# Patient Record
Sex: MALE | Race: WHITE | NOT HISPANIC OR LATINO | Employment: PART TIME | URBAN - METROPOLITAN AREA
[De-identification: names, ages, dates, MRNs, and addresses within clinical notes are randomized per-mention and may not be internally consistent; named-entity substitution may affect disease eponyms.]

---

## 2017-01-02 ENCOUNTER — CARE COORDINATION (OUTPATIENT)
Dept: CARE COORDINATION | Facility: CLINIC | Age: 32
End: 2017-01-02

## 2017-01-02 NOTE — Clinical Note
[facility] CARE COORDINATION  [facility]   [address] [place] [address]      January 5, 2017      Yuan MYRA Brandonsilvio  [address]  [facility] [address]    Dear Yuan,  I am the Clinic Care Coordinator that works with your primary care provider's clinic. I recently tried to call and was unable to reach you. Please call if future needs.  Below is a description of what Clinic Care Coordination is and how I can further assist you.     The Clinic Care Coordinator role is a Registered Nurse and/or  who understands the health care system. The goal of Clinic Care Coordination is to help you manage your health and improve access to the [facility] system in the most efficient manner.  The Registered Nurse can assist you in meeting your health care goals by providing education, coordinating services, and strengthening the communication among your providers. The  can assist you with financial, behavioral, psychosocial, and chemical dependency and counseling/psychiatric resources.    Please feel free to keep this letter and contact information to contact me at [phone] with any further questions or concerns that may arise. We at [facility] are focused on providing you with the highest-quality healthcare experience possible and that all starts with you.       Sincerely,     Mila Keating Care Coordinator RN  [facility]  [phone]

## 2017-01-02 NOTE — PROGRESS NOTES
Clinic Care Coordination Contact  [facility]/Voicemail    Referral Source: ED Follow-Up  Clinical Data: Care Coordinator Follow Up Outreach costochondritis   Outreach attempted x 1.  Left message on voicemail with call back information and requested return call.  Plan:  Care Coordinator will try to reach patient again in 1-2 business days.    Mila Keating Care Coordinator RN  [facility]  [phone]

## 2017-01-03 NOTE — PROGRESS NOTES
Clinic Care Coordination Contact  [facility]/Voicemail    Referral Source: ED Follow-Up  Clinical Data: Care Coordinator Outreach  Outreach attempted x 2.  Left message on voicemail with call back information and requested return call.  Plan:  Care Coordinator 2 business days unable to contact letter will be mailed     Milasa Keating Care Coordinator RN  [facility]  [phone]

## 2017-01-05 NOTE — PROGRESS NOTES
Clinic Care Coordination Contact  [facility]    Referral Source: ED Follow-Up  Clinical Data: Care Coordinator Outreach  Outreach attempted x 3.  Unable to contact letter mailed   Plan: Care Coordinator will do no further outreaches at this time.    Mila Keating Care Coordinator RN  [facility]  [phone]

## 2017-01-30 DIAGNOSIS — R42 DIZZINESS: Primary | ICD-10-CM

## 2017-01-30 DIAGNOSIS — F41.9 ANXIETY: ICD-10-CM

## 2017-01-30 NOTE — TELEPHONE ENCOUNTER
LORazepam (ATIVAN) 0.5 MG tablet  Last Written Prescription Date:  12/22/16  Last Fill Quantity: 30,   # refills: 0  Last Office Visit with [facility] prescribing provider: 8/4/2016    Future Office visit:    Next 5 appointments (look out 90 days)     Mar 28, 2017  9:30 AM   Return Visit with ARON Li CNP   [facility]    [address]  [facility] [address]   [phone]                   Routing refill request to provider for review/approval because:  Drug not on the [facility] refill protocol or controlled substance       checked 11/14/2016-No red flags.      JAGDISH Alberto

## 2017-02-06 NOTE — TELEPHONE ENCOUNTER
Why has it been 7 days?  I'm just seeing it now.  I'm going to let Agueda do it.  She is listed as his primary.  Most previous Rx's have come from her.      Thanks,  Deacon

## 2017-02-06 NOTE — TELEPHONE ENCOUNTER
[facility] pharmacy calling to check status of refill it was requested 7 days ago. Please review and advise.     Routed to Deacon Benson & Agueda Esteban

## 2017-02-07 RX ORDER — LORAZEPAM 0.5 MG/1
0.5 TABLET ORAL EVERY 6 HOURS PRN
Qty: 30 TABLET | Refills: 0 | OUTPATIENT
Start: 2017-02-07

## 2017-02-07 NOTE — TELEPHONE ENCOUNTER
I cannot fill this. It looks like it has been a long time since he was seen for anxiety. Does he have a mental health doctor at all?

## 2017-02-07 NOTE — TELEPHONE ENCOUNTER
Woman answered phone, said he wasn't available,  Asked that he give us a call back, said she wasn't able to write down phone #  Aditi Velasquez RN, BS  Clinical Nurse Triage .

## 2017-02-07 NOTE — TELEPHONE ENCOUNTER
I have not seen him for a long time, and he should be seen every 6 months for controlled medications. He has been seeing Deacon more lately, but he needs an appt either way, with one of us

## 2017-02-09 ENCOUNTER — OFFICE VISIT (OUTPATIENT)
Dept: FAMILY MEDICINE | Facility: CLINIC | Age: 32
End: 2017-02-09
Payer: MEDICARE

## 2017-02-09 VITALS
SYSTOLIC BLOOD PRESSURE: 130 MMHG | WEIGHT: 287 LBS | HEART RATE: 80 BPM | BODY MASS INDEX: 40.05 KG/M2 | TEMPERATURE: 97.8 F | DIASTOLIC BLOOD PRESSURE: 84 MMHG | RESPIRATION RATE: 16 BRPM

## 2017-02-09 DIAGNOSIS — F41.9 ANXIETY: ICD-10-CM

## 2017-02-09 DIAGNOSIS — Z23 NEED FOR PROPHYLACTIC VACCINATION AND INOCULATION AGAINST INFLUENZA: ICD-10-CM

## 2017-02-09 DIAGNOSIS — F32.1 MAJOR DEPRESSIVE DISORDER, SINGLE EPISODE, MODERATE (H): Primary | ICD-10-CM

## 2017-02-09 DIAGNOSIS — R42 DIZZINESS: ICD-10-CM

## 2017-02-09 DIAGNOSIS — J98.01 ACUTE BRONCHOSPASM: ICD-10-CM

## 2017-02-09 DIAGNOSIS — G47.00 INSOMNIA, UNSPECIFIED TYPE: ICD-10-CM

## 2017-02-09 PROCEDURE — 99214 OFFICE O/P EST MOD 30 MIN: CPT | Mod: 25 | Performed by: PHYSICIAN ASSISTANT

## 2017-02-09 PROCEDURE — 90686 IIV4 VACC NO PRSV 0.5 ML IM: CPT | Performed by: PHYSICIAN ASSISTANT

## 2017-02-09 PROCEDURE — G0008 ADMIN INFLUENZA VIRUS VAC: HCPCS | Performed by: PHYSICIAN ASSISTANT

## 2017-02-09 RX ORDER — CHOLECALCIFEROL (VITAMIN D3) 50 MCG
2000 TABLET ORAL DAILY
Qty: 90 TABLET | Refills: 1 | Status: SHIPPED | OUTPATIENT
Start: 2017-02-09 | End: 2017-07-20

## 2017-02-09 RX ORDER — ALBUTEROL SULFATE 90 UG/1
2 AEROSOL, METERED RESPIRATORY (INHALATION) EVERY 6 HOURS PRN
Qty: 1 INHALER | Refills: 1 | Status: SHIPPED | OUTPATIENT
Start: 2017-02-09 | End: 2023-10-08

## 2017-02-09 RX ORDER — DULOXETIN HYDROCHLORIDE 60 MG/1
60 CAPSULE, DELAYED RELEASE ORAL DAILY
Qty: 90 CAPSULE | Refills: 1 | Status: SHIPPED | OUTPATIENT
Start: 2017-02-09 | End: 2017-08-01

## 2017-02-09 RX ORDER — TRAZODONE HYDROCHLORIDE 50 MG/1
150 TABLET, FILM COATED ORAL AT BEDTIME
Qty: 90 TABLET | Refills: 1 | Status: SHIPPED | OUTPATIENT
Start: 2017-02-09 | End: 2019-04-03

## 2017-02-09 RX ORDER — DULOXETIN HYDROCHLORIDE 30 MG/1
60 CAPSULE, DELAYED RELEASE ORAL DAILY
Refills: 0 | COMMUNITY
Start: 2016-08-16 | End: 2017-02-09

## 2017-02-09 RX ORDER — LORAZEPAM 0.5 MG/1
0.5 TABLET ORAL EVERY 6 HOURS PRN
Qty: 30 TABLET | Refills: 0 | Status: SHIPPED | OUTPATIENT
Start: 2017-02-09 | End: 2019-04-03

## 2017-02-09 RX ORDER — BUSPIRONE HYDROCHLORIDE 5 MG/1
TABLET ORAL
Qty: 150 TABLET | Refills: 0 | Status: SHIPPED
Start: 2017-02-09 | End: 2019-04-03

## 2017-02-09 ASSESSMENT — ANXIETY QUESTIONNAIRES
3. WORRYING TOO MUCH ABOUT DIFFERENT THINGS: MORE THAN HALF THE DAYS
IF YOU CHECKED OFF ANY PROBLEMS ON THIS QUESTIONNAIRE, HOW DIFFICULT HAVE THESE PROBLEMS MADE IT FOR YOU TO DO YOUR WORK, TAKE CARE OF THINGS AT HOME, OR GET ALONG WITH OTHER PEOPLE: SOMEWHAT DIFFICULT
5. BEING SO RESTLESS THAT IT IS HARD TO SIT STILL: MORE THAN HALF THE DAYS
6. BECOMING EASILY ANNOYED OR IRRITABLE: MORE THAN HALF THE DAYS
GAD7 TOTAL SCORE: 14
7. FEELING AFRAID AS IF SOMETHING AWFUL MIGHT HAPPEN: MORE THAN HALF THE DAYS
1. FEELING NERVOUS, ANXIOUS, OR ON EDGE: MORE THAN HALF THE DAYS
2. NOT BEING ABLE TO STOP OR CONTROL WORRYING: MORE THAN HALF THE DAYS

## 2017-02-09 ASSESSMENT — PATIENT HEALTH QUESTIONNAIRE - PHQ9: 5. POOR APPETITE OR OVEREATING: MORE THAN HALF THE DAYS

## 2017-02-09 NOTE — NURSING NOTE
"Chief Complaint   Patient presents with     Anxiety     phq9       Initial /94 mmHg  Pulse 80  Temp(Src) 97.8  F (36.6  C) (Oral)  Resp 16  Wt 287 lb (130.182 kg) Estimated body mass index is 40.05 kg/(m^2) as calculated from the following:    Height as of 12/6/16: 5' 11\" (1.803 m).    Weight as of this encounter: 287 lb (130.182 kg).  Medication Reconciliation: complete   Emily Guerrero MA      "

## 2017-02-09 NOTE — PROGRESS NOTES
"  SUBJECTIVE:                                                    Yuan Hensley is a 31 year old male who presents to clinic today for the following health issues:      Medication Followup of Ativan    Taking Medication as prescribed: yes    Side Effects:  None    Medication Helping Symptoms:  yes       PROBLEMS TO ADD ON...  Hypertension Follow-up      Outpatient blood pressures are not being checked.    Low Salt Diet: no added salt       Depression and Anxiety Follow-Up    Status since last visit: Worsened     Other associated symptoms:None    Complicating factors:     Significant life event: Yes-  Many different events     Current substance abuse: None    PHQ-9 SCORE 2015   Total Score - - -   Total Score 20 21 21     FROYLAN-7 SCORE 2015   Total Score 21 - -   Total Score - 21 21        PHQ-9  English      PHQ-9   Any Language     GAD7     Was seeing a psychiatrist but not anymore.  Will need refill of cymbalta and vitamin D.  End of  was \"not good\".  In  his son , then grandfather and grandmother both ended up in nursing home.  Had been off work for 8 years.  Was caring for grandparents and depression/anxiety were a problem.  Got a job that he will be starting in next two weeks at [employer].  Nervous about this.    Ativan helps also with dizziness.  When he doesn't take it his head \"just doesn't feel there\".  Does get vertigo, maybe hospital put him on.  Has been a couple years perhaps that he's been on.  Takes ativan in PM, usually to try to help sleep.  Has not been sleeping that well.  Also has trazodone.      Problem list and histories reviewed & adjusted, as indicated.  Additional history: as documented    Problem list, Medication list, Allergies, and Medical/Social/Surgical histories [facility] in [facility] and updated as appropriate.    ROS:  Constitutional, HEENT, cardiovascular, pulmonary, gi and gu systems are negative, except as otherwise " "noted.    OBJECTIVE:                                                    /84  Pulse 80  Temp 97.8  F (36.6  C) (Oral)  Resp 16  Wt 287 lb (130.2 kg)  BMI 40.03 kg/m2  Body mass index is 40.03 kg/(m^2).  GENERAL: healthy, alert and no distress  MS: no gross musculoskeletal defects noted, no edema  SKIN: no suspicious lesions or rashes  PSYCH: mentation appears normal, affect flat and anxious    Diagnostic Test Results:  none      ASSESSMENT/PLAN:                                                    1. Major depressive disorder, single episode, moderate (H)  Refilled.  Continue on Cymbalta.  - Cholecalciferol (VITAMIN D) 2000 UNITS tablet; Take 2,000 Units by mouth daily  Dispense: 90 tablet; Refill: 1    2. Anxiety  Refilled, start Buspar to see if we can decrease the amount of Ativan he is taking.  - DULoxetine (CYMBALTA) 60 MG EC capsule; Take 1 capsule (60 mg) by mouth daily  Dispense: 90 capsule; Refill: 1  - busPIRone (BUSPAR) 5 MG tablet; Start at 5 mg twice daily for 3 days, then 7.5 mg (1.5 tabs) twice daily for 3 days, then 10 mg (2 tabs) twice daily for 3 days, then 12.5 mg (2.5 tabs) twice daily for 3 days, then 15 mg (3 tabs) twice daily and stay at that dose  Dispense: 150 tablet; Refill: 0  - LORazepam (ATIVAN) 0.5 MG tablet; Take 1 tablet (0.5 mg) by mouth every 6 hours as needed for anxiety  Dispense: 30 tablet; Refill: 0    3. Acute bronchospasm  Refilled.  - albuterol (ALBUTEROL) 108 (90 BASE) MCG/ACT Inhaler; Inhale 2 puffs into the lungs every 6 hours as needed for shortness of breath / dyspnea  Dispense: 1 Inhaler; Refill: 1    4. Need for prophylactic vaccination and inoculation against influenza    - FLU VAC, SPLIT VIRUS IM > 3 YO (QUADRIVALENT) [66026]  - Vaccine Administration, Initial [16321]    5. Dizziness  It seems he was put on this for \"dizziness\" and believes that is also what he is taking this for.  Discussed addictive nature of this medication and will try to wean off with use of " Buspar for anxiety.  - LORazepam (ATIVAN) 0.5 MG tablet; Take 1 tablet (0.5 mg) by mouth every 6 hours as needed for anxiety  Dispense: 30 tablet; Refill: 0    6. Insomnia, unspecified type    - traZODone (DESYREL) 50 MG tablet; Take 3 tablets (150 mg) by mouth At Bedtime  Dispense: 90 tablet; Refill: 1        Deacon Benson PA-C  [facility]

## 2017-02-09 NOTE — MR AVS SNAPSHOT
After Visit Summary   2/9/2017    Yuan Hensley    MRN: [id number]           Patient Information     Date Of Birth          1985        Visit Information        Provider Department      2/9/2017 10:20 AM Deacon Benson PA-C [facility]        Today's Diagnoses     Major depressive disorder, single episode, moderate (H)    -  1     Anxiety         Acute bronchospasm         Need for prophylactic vaccination and inoculation against influenza         Dizziness         Insomnia, unspecified type            Follow-ups after your visit        Follow-up notes from your care team     Return in about 2 months (around 4/9/2017).      Your next 10 appointments already scheduled     Mar 28, 2017  9:30 AM   Return Visit with ARON Li CNP   [facility]    [address]  [facility] [address] [phone]              Who to contact     If you have questions or need follow up information about today's clinic visit or your schedule please contact [facility] directly at [phone].  Normal or non-critical lab and imaging results will be communicated to you by [employer], letter or phone within 4 business days after the clinic has received the results. If you do not hear from us within 7 days, please contact the clinic through [employer] or phone. If you have a critical or abnormal lab result, we will notify you by phone as soon as possible.  Submit refill requests through [employer] or call your pharmacy and they will forward the refill request to us. Please allow 3 business days for your refill to be completed.          Additional Information About Your Visit        MyChart Information     [employer] gives you secure access to your electronic health record. If you see a primary care provider, you can also send messages to your care team and make appointments. If you have questions, please call your  primary care clinic.  If you do not have a primary care provider, please call [phone] and they will assist you.        Care EveryWhere ID     This is your Care EveryWhere ID. This could be used by other organizations to access your [facility] medical records  [id number]        Your Vitals Were     Pulse Temperature Respirations             80 97.8  F (36.6  C) (Oral) 16          Blood Pressure from Last 3 Encounters:   02/09/17 130/94   12/27/16 130/82   12/07/16 115/77    Weight from Last 3 Encounters:   02/09/17 287 lb (130.182 kg)   12/27/16 290 lb 9.6 oz (131.815 kg)   12/06/16 290 lb (131.543 kg)              We Performed the Following     FLU VAC, SPLIT VIRUS IM > 3 YO (QUADRIVALENT) [96374]     Vaccine Administration, Initial [28783]          Today's Medication Changes          These changes are accurate as of: 2/9/17 11:13 AM.  If you have any questions, ask your nurse or doctor.               Start taking these medicines.        Dose/Directions    busPIRone 5 MG tablet   Commonly known as:  BUSPAR   Used for:  Anxiety   Started by:  Deacon Benson PA-C        Start at 5 mg twice daily for 3 days, then 7.5 mg (1.5 tabs) twice daily for 3 days, then 10 mg (2 tabs) twice daily for 3 days, then 12.5 mg (2.5 tabs) twice daily for 3 days, then 15 mg (3 tabs) twice daily and stay at that dose   Quantity:  150 tablet   Refills:  0         These medicines have changed or have updated prescriptions.        Dose/Directions    DULoxetine 60 MG EC capsule   Commonly known as:  CYMBALTA   This may have changed:  medication strength   Used for:  Anxiety   Changed by:  Deacon Benson PA-C        Dose:  60 mg   Take 1 capsule (60 mg) by mouth daily   Quantity:  90 capsule   Refills:  1       traZODone 50 MG tablet   Commonly known as:  DESYREL   This may have changed:  how much to take   Used for:  Insomnia, unspecified type   Changed by:  Deacon Benson PA-C        Dose:  150 mg   Take 3  tablets (150 mg) by mouth At Bedtime   Quantity:  90 tablet   Refills:  1       vitamin D 2000 UNITS tablet   This may have changed:    - how much to take  - how to take this  - when to take this   Used for:  Major depressive disorder, single episode, moderate (H)   Changed by:  Deacon Benson PA-C        Dose:  2000 Units   Take 2,000 Units by mouth daily   Quantity:  90 tablet   Refills:  1            Where to get your medicines      These medications were sent to [id number] - [facility] [address] [place] [id number]     Phone:  [phone]    - albuterol 108 (90 BASE) MCG/ACT Inhaler  - busPIRone 5 MG tablet  - DULoxetine 60 MG EC capsule  - traZODone 50 MG tablet  - vitamin D 2000 UNITS tablet      Some of these will need a paper prescription and others can be bought over the counter.  Ask your nurse if you have questions.     Bring a paper prescription for each of these medications    - LORazepam 0.5 MG tablet             Primary Care Provider Office Phone # Fax #    Deacon Benson PA-C [phone]       [facility] [address]  [facility] [address]        Thank you!     Thank you for choosing [facility]  for your care. Our goal is always to provide you with excellent care. Hearing back from our patients is one way we can continue to improve our services. Please take a few minutes to complete the written survey that you may receive in the mail after your visit with us. Thank you!             Your Updated Medication List - Protect others around you: Learn how to safely use, store and throw away your medicines at www.disposemymeds.org.          This list is accurate as of: 2/9/17 11:13 AM.  Always use your most recent med list.                   Brand Name Dispense Instructions for use    albuterol 108 (90 BASE) MCG/ACT Inhaler    albuterol    1 Inhaler    Inhale 2 puffs into the lungs every 6 hours as  needed for shortness of breath / dyspnea       busPIRone 5 MG tablet    BUSPAR    150 tablet    Start at 5 mg twice daily for 3 days, then 7.5 mg (1.5 tabs) twice daily for 3 days, then 10 mg (2 tabs) twice daily for 3 days, then 12.5 mg (2.5 tabs) twice daily for 3 days, then 15 mg (3 tabs) twice daily and stay at that dose       DULoxetine 60 MG EC capsule    CYMBALTA    90 capsule    Take 1 capsule (60 mg) by mouth daily       fluticasone 50 MCG/ACT spray    FLONASE    16 g    Spray 1-2 sprays into both nostrils daily       gabapentin 100 MG capsule    NEURONTIN    60 capsule    Take 1 capsule by mouth about 30 minutes prior to typical onset of restlessness. May increase by 1 capsule as needed every 3 days to max of 3.       LORazepam 0.5 MG tablet    ATIVAN    30 tablet    Take 1 tablet (0.5 mg) by mouth every 6 hours as needed for anxiety       losartan 100 MG tablet    COZAAR    90 tablet    Take 1 tablet (100 mg) by mouth daily       metFORMIN 500 MG 24 hr tablet    GLUCOPHAGE-XR    120 tablet    Take 2 tablets (1,000 mg) by mouth 2 times daily (with meals)       nebivolol 5 MG tablet    BYSTOLIC    30 tablet    Take 1 tablet (5 mg) by mouth daily       omeprazole 40 MG capsule    priLOSEC    60 capsule    Take 1 capsule (40 mg) by mouth 2 times daily Take 30-60 minutes before a meal.       phentermine 37.5 MG tablet    ADIPEX-P    31 tablet    Take 1 tablet (37.5 mg) by mouth every morning (before breakfast)       traZODone 50 MG tablet    DESYREL    90 tablet    Take 3 tablets (150 mg) by mouth At Bedtime       vitamin D 2000 UNITS tablet     90 tablet    Take 2,000 Units by mouth daily

## 2017-02-09 NOTE — PROGRESS NOTES
Injectable Influenza Immunization Documentation    1.  Is the person to be vaccinated sick today?  No    2. Does the person to be vaccinated have an allergy to eggs or to a component of the vaccine?  No    3. Has the person to be vaccinated today ever had a serious reaction to influenza vaccine in the past?  No    4. Has the person to be vaccinated ever had Guillain-[place]ville syndrome?  No     Form completed by Emily Guerrero MA

## 2017-02-10 ASSESSMENT — ANXIETY QUESTIONNAIRES: GAD7 TOTAL SCORE: 14

## 2017-02-10 ASSESSMENT — PATIENT HEALTH QUESTIONNAIRE - PHQ9: SUM OF ALL RESPONSES TO PHQ QUESTIONS 1-9: 20

## 2017-03-13 DIAGNOSIS — I10 ESSENTIAL HYPERTENSION, BENIGN: ICD-10-CM

## 2017-03-13 RX ORDER — LOSARTAN POTASSIUM 100 MG/1
100 TABLET ORAL DAILY
Qty: 90 TABLET | Refills: 1 | Status: SHIPPED | OUTPATIENT
Start: 2017-03-13 | End: 2019-04-03

## 2017-03-13 NOTE — TELEPHONE ENCOUNTER
Pending Prescriptions:                       Disp   Refills    losartan (COZAAR) 100 MG tablet           90 tab*1            Sig: Take 1 tablet (100 mg) by mouth daily            Last Written Prescription Date: 9/8/2016    Last Fill Quantity: 90, # refills: 1  Last Office Visit with [facility], [facility] or [facility] prescribing provider: 2/9/2017  Next 5 appointments (look out 90 days)     Mar 28, 2017  9:30 AM CDT   Return Visit with ARON Li CNP   [facility]    [address]  [facility] [address]   [phone]                   Potassium   Date Value Ref Range Status   12/06/2016 3.9 3.4 - 5.3 mmol/L Final     Creatinine   Date Value Ref Range Status   12/06/2016 0.87 0.66 - 1.25 mg/dL Final     BP Readings from Last 3 Encounters:   02/09/17 130/84   12/27/16 130/82   12/07/16 115/77

## 2017-03-24 ENCOUNTER — OFFICE VISIT (OUTPATIENT)
Dept: FAMILY MEDICINE | Facility: CLINIC | Age: 32
End: 2017-03-24
Payer: MEDICARE

## 2017-03-24 VITALS
TEMPERATURE: 98.7 F | SYSTOLIC BLOOD PRESSURE: 134 MMHG | DIASTOLIC BLOOD PRESSURE: 108 MMHG | RESPIRATION RATE: 16 BRPM | HEART RATE: 92 BPM | BODY MASS INDEX: 39.05 KG/M2 | WEIGHT: 280 LBS | OXYGEN SATURATION: 96 %

## 2017-03-24 DIAGNOSIS — I10 ESSENTIAL HYPERTENSION: ICD-10-CM

## 2017-03-24 DIAGNOSIS — R68.89 FLU-LIKE SYMPTOMS: Primary | ICD-10-CM

## 2017-03-24 LAB
FLUAV+FLUBV AG SPEC QL: NEGATIVE
FLUAV+FLUBV AG SPEC QL: NORMAL
SPECIMEN SOURCE: NORMAL

## 2017-03-24 PROCEDURE — 87804 INFLUENZA ASSAY W/OPTIC: CPT | Performed by: PHYSICIAN ASSISTANT

## 2017-03-24 PROCEDURE — 99213 OFFICE O/P EST LOW 20 MIN: CPT | Performed by: PHYSICIAN ASSISTANT

## 2017-03-24 RX ORDER — CODEINE PHOSPHATE AND GUAIFENESIN 10; 100 MG/5ML; MG/5ML
SOLUTION ORAL
Qty: 120 ML | Refills: 0 | Status: SHIPPED | OUTPATIENT
Start: 2017-03-24 | End: 2019-04-03

## 2017-03-24 NOTE — PROGRESS NOTES
SUBJECTIVE:                                                    Yuan Hensley is a 31 year old male who presents to clinic today for the following health issues:      RESPIRATORY SYMPTOMS      Duration: couple days    Description  rhinorrhea, sore throat, cough, fever, chills, headache, fatigue/malaise and nausea    Severity: moderate    Accompanying signs and symptoms: vomiting    History (predisposing factors):  none    Precipitating or alleviating factors: exposure to illness at home    Therapies tried and outcome:  Tylenol cold     Few days of illness and parents both at home have been pretty ill with fever and cough etc.  Just started a new job a month ago.  Has been working outside.  Having body aches, chills.  Vomiting last night.  Called in to work today.      PROBLEMS TO ADD ON...  Hypertension Follow-up      Outpatient blood pressures are not being checked.    Low Salt Diet: no added salt     Has not taken his medication today and is taking OTC cough/cold medication currently.      Problem list and histories reviewed & adjusted, as indicated.  Additional history: as documented    BP Readings from Last 3 Encounters:   03/24/17 (!) 134/108   02/09/17 130/84   12/27/16 130/82    Wt Readings from Last 3 Encounters:   03/24/17 280 lb (127 kg)   02/09/17 287 lb (130.2 kg)   12/27/16 290 lb 9.6 oz (131.8 kg)            Labs reviewed in EPIC    Reviewed and updated as needed this visit by clinical staff  Tobacco  Allergies  Meds  Problems  Med Hx  Surg Hx  Fam Hx  Soc Hx        Reviewed and updated as needed this visit by Provider         ROS:  Constitutional, HEENT, cardiovascular, pulmonary, gi and gu systems are negative, except as otherwise noted.    OBJECTIVE:                                                    BP (!) 134/108 (BP Location: Left arm, Patient Position: Chair, Cuff Size: Adult Large)  Pulse 92  Temp 98.7  F (37.1  C) (Oral)  Resp 16  Wt 280 lb (127 kg)  SpO2 96%  BMI 39.05  kg/m2  Body mass index is 39.05 kg/(m^2).  GENERAL: healthy, alert and no distress  HENT: ear canals and TM's normal, nose and mouth without ulcers or lesions  NECK: no adenopathy, no asymmetry, masses, or scars and thyroid normal to palpation  RESP: lungs clear to auscultation - no rales, rhonchi or wheezes  MS: no gross musculoskeletal defects noted, no edema  SKIN: no suspicious lesions or rashes  PSYCH: mentation appears normal and affect flat    Diagnostic Test Results:  Results for orders placed or performed in visit on 03/24/17 (from the past 24 hour(s))   Influenza A/B antigen   Result Value Ref Range    Influenza A/B Agn Specimen Nasal     Influenza A Negative NEG    Influenza B  NEG     Negative   Test results must be correlated with clinical data. If necessary, results   should be confirmed by a molecular assay or viral culture.          ASSESSMENT/PLAN:                                                    1. Flu-like symptoms  Recommended supportive cares including warm salt water gargles, Tylenol/Ibuprofen as directed OTC, rest, humidifier.  Follow-up in 2-3 days if symptoms are worsening or not improving as expected/discussed.  He can use his inhaler that he has at home also for cough.    - Influenza A/B antigen  - guaiFENesin-codeine (ROBITUSSIN AC) 100-10 MG/5ML SOLN solution; 1-2 tsp PO qhs prn cough  Dispense: 120 mL; Refill: 0    2. Essential hypertension  - continue with medications as prescribed.  Follow up as directed.          Deacon Benson PA-C  [facility]

## 2017-03-24 NOTE — NURSING NOTE
"Chief Complaint   Patient presents with     URI     Patient Request for Note/Letter     for work       Initial BP (!) 134/108 (BP Location: Left arm, Patient Position: Chair, Cuff Size: Adult Large)  Pulse 92  Temp 98.7  F (37.1  C) (Oral)  Resp 16  Wt 280 lb (127 kg)  SpO2 96%  BMI 39.05 kg/m2 Estimated body mass index is 39.05 kg/(m^2) as calculated from the following:    Height as of 12/6/16: 5' 11\" (1.803 m).    Weight as of this encounter: 280 lb (127 kg).  Medication Reconciliation: complete   Emily Guerrero MA      "

## 2017-03-24 NOTE — MR AVS SNAPSHOT
After Visit Summary   3/24/2017    Yuan Hensley    MRN: [id number]           Patient Information     Date Of Birth          1985        Visit Information        Provider Department      3/24/2017 9:20 AM Deacon Benson PA-C [facility]        Today's Diagnoses     Flu-like symptoms    -  1      Care Instructions    Afrin nasal spray for 3 days.        Follow-ups after your visit        Follow-up notes from your care team     Return if symptoms worsen or fail to improve.      Your next 10 appointments already scheduled     Mar 28, 2017  9:30 AM CDT   [facility] Visit with ARON Li CNP   [facility]    [address] [facility] [address] [phone]              Who to contact     If you have questions or need follow up information about today's clinic visit or your schedule please contact [facility] directly at [phone].  Normal or non-critical lab and imaging results will be communicated to you by [employer], letter or phone within 4 business days after the clinic has received the results. If you do not hear from us within 7 days, please contact the clinic through [employer] or phone. If you have a critical or abnormal lab result, we will notify you by phone as soon as possible.  Submit refill requests through [employer] or call your pharmacy and they will forward the refill request to us. Please allow 3 business days for your refill to be completed.          Additional Information About Your Visit        MyChart Information     [employer] gives you secure access to your electronic health record. If you see a primary care provider, you can also send messages to your care team and make appointments. If you have questions, please call your primary care clinic.  If you do not have a primary care provider, please call [phone] and they will assist you.        Care EveryWhere ID     This  is your Care EveryWhere ID. This could be used by other organizations to access your [facility] medical records  [id number]        Your Vitals Were     Pulse Temperature Respirations Pulse Oximetry BMI (Body Mass Index)       92 98.7  F (37.1  C) (Oral) 16 96% 39.05 kg/m2        Blood Pressure from Last 3 Encounters:   03/24/17 (!) 134/108   02/09/17 130/84   12/27/16 130/82    Weight from Last 3 Encounters:   03/24/17 280 lb (127 kg)   02/09/17 287 lb (130.2 kg)   12/27/16 290 lb 9.6 oz (131.8 kg)              We Performed the Following     Influenza A/B antigen          Today's Medication Changes          These changes are accurate as of: 3/24/17 10:24 AM.  If you have any questions, ask your nurse or doctor.               Start taking these medicines.        Dose/Directions    guaiFENesin-codeine 100-10 MG/5ML Soln solution   Commonly known as:  ROBITUSSIN AC   Used for:  Flu-like symptoms   Started by:  Deacon Benson PA-C        1-2 tsp PO qhs prn cough   Quantity:  120 mL   Refills:  0            Where to get your medicines      Some of these will need a paper prescription and others can be bought over the counter.  Ask your nurse if you have questions.     Bring a paper prescription for each of these medications     guaiFENesin-codeine 100-10 MG/5ML Soln solution                Primary Care Provider Office Phone # Fax #    Deacon Benson PA-C [phone]       [facility] [address]  [address] [facility] [address]        Thank you!     Thank you for choosing [facility]  for your care. Our goal is always to provide you with excellent care. Hearing back from our patients is one way we can continue to improve our services. Please take a few minutes to complete the written survey that you may receive in the mail after your visit with us. Thank you!             Your Updated Medication List - Protect others around you: Learn how to safely use, store and  throw away your medicines at www.disposemymeds.org.          This list is accurate as of: 3/24/17 10:24 AM.  Always use your most recent med list.                   Brand Name Dispense Instructions for use    albuterol 108 (90 BASE) MCG/ACT Inhaler    albuterol    1 Inhaler    Inhale 2 puffs into the lungs every 6 hours as needed for shortness of breath / dyspnea       busPIRone 5 MG tablet    BUSPAR    150 tablet    Start at 5 mg twice daily for 3 days, then 7.5 mg (1.5 tabs) twice daily for 3 days, then 10 mg (2 tabs) twice daily for 3 days, then 12.5 mg (2.5 tabs) twice daily for 3 days, then 15 mg (3 tabs) twice daily and stay at that dose       DULoxetine 60 MG EC capsule    CYMBALTA    90 capsule    Take 1 capsule (60 mg) by mouth daily       fluticasone 50 MCG/ACT spray    FLONASE    16 g    Spray 1-2 sprays into both nostrils daily       gabapentin 100 MG capsule    NEURONTIN    60 capsule    Take 1 capsule by mouth about 30 minutes prior to typical onset of restlessness. May increase by 1 capsule as needed every 3 days to max of 3.       guaiFENesin-codeine 100-10 MG/5ML Soln solution    ROBITUSSIN AC    120 mL    1-2 tsp PO qhs prn cough       LORazepam 0.5 MG tablet    ATIVAN    30 tablet    Take 1 tablet (0.5 mg) by mouth every 6 hours as needed for anxiety       losartan 100 MG tablet    COZAAR    90 tablet    Take 1 tablet (100 mg) by mouth daily       metFORMIN 500 MG 24 hr tablet    GLUCOPHAGE-XR    120 tablet    Take 2 tablets (1,000 mg) by mouth 2 times daily (with meals)       nebivolol 5 MG tablet    BYSTOLIC    30 tablet    Take 1 tablet (5 mg) by mouth daily       omeprazole 40 MG capsule    priLOSEC    60 capsule    Take 1 capsule (40 mg) by mouth 2 times daily Take 30-60 minutes before a meal.       phentermine 37.5 MG tablet    ADIPEX-P    31 tablet    Take 1 tablet (37.5 mg) by mouth every morning (before breakfast)       traZODone 50 MG tablet    DESYREL    90 tablet    Take 3 tablets (150  mg) by mouth At Bedtime       vitamin D 2000 UNITS tablet     90 tablet    Take 2,000 Units by mouth daily

## 2017-03-24 NOTE — LETTER
[facility]  [address] [place] [address], [address]  [facility] [address] [phone]          March 24, 2017    RE:  Yuan Hensley                                                                                                                                                       [address]  [facility] [address]            To whom it may concern:    Yuan Hensley is under my professional care for Flu-like symptoms.  He may return to work on March 27, 2017.      Sincerely,          Deacon Benson PA-C

## 2017-04-10 ENCOUNTER — TELEPHONE (OUTPATIENT)
Dept: FAMILY MEDICINE | Facility: CLINIC | Age: 32
End: 2017-04-10

## 2017-04-10 NOTE — LETTER
[address], [address]  [facility] [address]  [phone]  April 25, 2017    Yuan Hensley  [address]  [facility] [address]      Dear Yuan,    I care about your health and have reviewed your health plan.  I have reviewed your medical conditions, medication list, and lab results and am making recommendations  based on this review, to better manage your health.    You are particularly in need of attention regarding:  -High Blood Pressure    I am recommending that you:  -schedule a NURSE-ONLY BLOOD PRESSURE APPOINTMENT within the next 1-4 weeks.      Here is a list of Health Maintenance topics that are due now or due soon:  There are no preventive care reminders to display for this patient.    Please call us at [phone] (or use [employer]) to address the above   recommendations.    Thank you for trusting [facility] and we appreciate the opportunity to serve you.  We look forward to supporting your healthcare needs in the future.    Healthy Regards,    Deacon PETERSEN

## 2017-04-10 NOTE — TELEPHONE ENCOUNTER
Panel Management Review      Patient has the following on his problem list:     Hypertension   Last three blood pressure readings:  BP Readings from Last 3 Encounters:   03/24/17 (!) 134/108   02/09/17 130/84   12/27/16 130/82     Blood pressure: FAILED    HTN Guidelines:  Age 18-59 BP range:  Less than 140/90  Age 60-85 with Diabetes:  Less than 140/90  Age 60-85 without Diabetes:  less than 150/90      Composite cancer screening  Chart review shows that this patient is due/due soon for the following None  Summary:    Patient is due/failing the following:   BP CHECK    Action needed:   Patient needs nurse only BLOOD PRESSURE check appointment.    Type of outreach:    Sent [employer] message.    Questions for provider review:    None                                                                                                                                    Lisa Magill, CMA       Chart routed to Care Team .

## 2017-04-17 ENCOUNTER — OFFICE VISIT (OUTPATIENT)
Dept: FAMILY MEDICINE | Facility: CLINIC | Age: 32
End: 2017-04-17
Payer: MEDICARE

## 2017-04-17 ENCOUNTER — RADIANT APPOINTMENT (OUTPATIENT)
Dept: GENERAL RADIOLOGY | Facility: CLINIC | Age: 32
End: 2017-04-17
Attending: FAMILY MEDICINE
Payer: MEDICARE

## 2017-04-17 VITALS
HEART RATE: 76 BPM | DIASTOLIC BLOOD PRESSURE: 90 MMHG | TEMPERATURE: 98.3 F | OXYGEN SATURATION: 97 % | SYSTOLIC BLOOD PRESSURE: 130 MMHG

## 2017-04-17 DIAGNOSIS — M25.531 RIGHT WRIST PAIN: ICD-10-CM

## 2017-04-17 DIAGNOSIS — M25.531 RIGHT WRIST PAIN: Primary | ICD-10-CM

## 2017-04-17 DIAGNOSIS — M25.561 RIGHT KNEE PAIN, UNSPECIFIED CHRONICITY: ICD-10-CM

## 2017-04-17 PROCEDURE — 99213 OFFICE O/P EST LOW 20 MIN: CPT | Performed by: FAMILY MEDICINE

## 2017-04-17 PROCEDURE — 73562 X-RAY EXAM OF KNEE 3: CPT | Mod: RT

## 2017-04-17 PROCEDURE — 73130 X-RAY EXAM OF HAND: CPT | Mod: RT

## 2017-04-17 NOTE — NURSING NOTE
"Chief Complaint   Patient presents with     Musculoskeletal Problem     Pain on L wrist and knuckles- swollen x3 days, L knee pain-when walking seems to locked up.       Initial /90 (BP Location: Right arm, Patient Position: Chair, Cuff Size: Adult Large)  Pulse 76  Temp 98.3  F (36.8  C) (Oral)  SpO2 97% Estimated body mass index is 39.05 kg/(m^2) as calculated from the following:    Height as of 12/6/16: 5' 11\" (1.803 m).    Weight as of 3/24/17: 280 lb (127 kg).  Medication Reconciliation: complete     Gabriela Jenkins CMA (AAMA)        "

## 2017-04-17 NOTE — MR AVS SNAPSHOT
After Visit Summary   4/17/2017    Yuan Hensley    MRN: [id number]           Patient Information     Date Of Birth          1985        Visit Information        Provider Department      4/17/2017 3:45 PM Varghese Carrillo MD [facility]        Today's Diagnoses     Right wrist pain    -  1    Right knee pain, unspecified chronicity          Care Instructions      Knee Pain  Knee pain is very common. It s especially common in active people who put a lot of pressure on their knees, like runners. It affects women more often than men.  Your kneecap (patella) is a thick, round bone. It covers and protects the front portion of your knee joint. It moves along a groove in your thighbone (femur) as part of the patellofemoral joint. A layer of cartilage surrounds the underside of your kneecap. This layer protects it from grinding against your femur.  When this cartilage softens and breaks down, it can cause knee pain. This is partly because of repetitive stress. The stress irritates the lining of the joint. This causes pain in the underlying bone.  What causes knee pain?  Many things can cause knee pain. You may have more than one cause. Some of these include:    Overuse of the knee joint    The kneecap doesn t line up with the tissue around it    Damage to small nerves in the area    Damage to the ligament-like structure that holds the kneecap in place (retinaculum)    Breakdown of the bone under the cartilage    Swelling in the soft tissues around the kneecap    Injury  You might be more likely to have knee pain if you:    Exercise a lot    Recently increased the intensity of your workouts    Have a body mass index (BMI) greater than 25    Have poor alignment of your kneecap    Walk with your feet turned overly outward or inward    Have weakness in surrounding muscle groups (inner quad or hip adductor muscles)    Have too much tightness in surrounding muscle groups (hamstrings or  iliotibial band)    Have a recent history of injury to the area    Are female  Symptoms of knee pain  This type of knee pain is a dull, aching pain in the front of the knee in the area under and around the kneecap. This pain may start quickly or slowly. Your pain might be worse when you squat, run, or sit for a long time. You might also sometimes feel like your knee is giving out. You may have symptoms in one or both of your knees.  Diagnosing knee pain  Your health care provider will ask about your medical history and your symptoms. Be sure to describe any activities that make your knee pain worse. He or she will look at your knee. This will include tests of your range of motion, strength, and areas of pain of your knee. Your knee alignment will be checked.  Your health care provider will need to rule out other causes of your knee pain, such as arthritis. You may need an imaging test, such as an X-ray or MRI.  Treatment for knee pain  Treatments that can help ease your symptoms may include:    Avoiding activities for a while that make your pain worse, returning to activity over time    Icing the outside of your knee when it causes you pain    Taking over-the-counter pain medicine    Wearing a knee brace or taping your knee to support it    Wearing special shoe inserts to help keep your feet in the proper alignment    Doing special exercises to stretch and strengthen the muscles around your hip and your knee  These steps help most people manage knee pain. But some cases of knee pain need to be treated with surgery. You may need surgery right away. Or you may need it later if other treatments don t work. Your health care provider may refer you to an orthopedic surgeon. He or she will talk with you about your choices.  Preventing knee pain  Losing weight and correcting excess muscle tightness or muscle weakness may help lower your risk.  In some cases, you can prevent knee pain. To help prevent a flare-up of knee  pain, you do these things:    Regularly do all the exercises your doctor or physical therapist advises    Support your knee as advised by your doctor or physical therapist    Increase training gradually, and ease up on training when needed    Have an expert check your gait for running or other sporting activities    Stretch properly before and after exercise    Replace your running shoes regularly    Lose excess weight     When to call your health care provider  Call your health care provider right away if:    Your symptoms don t get better after a few weeks of treatment    You have any new symptoms        1585-1550 The [employer]. [address] [place] [address]. All rights reserved. This information is not intended as a substitute for professional medical care. Always follow your healthcare professional's instructions.        Tendonitis  A tendon is the thick fibrous cord that joins muscle to bone and allows joints to move. When a tendon becomes inflamed, it is called tendonitis. This can occur from overuse, injury, or infection. This usually involves the shoulders, forearm, wrist, hands and foot. Symptoms include pain, swelling and tenderness to the touch. Moving the joint increases the pain.  It takes 4 to 6 weeks for tendonitis to heal. It is treated by preventing motion of the tendon with a splint or brace and the use of anti-inflammatory medicine.  Home care    Some people find relief with ice packs. These can be crushed or cubed ice in a plastic bag or a bag of frozen vegetables wrapped in a thin towel. Other people get better relief with heat. This can include a hot shower, hot bath, or a moist towel warmed in a microwave. Try each and use the method that feels best, for 15 to 20 minutes several times a day.    Rest the inflamed joint and protect it from movement.    You may use over-the-counter ibuprofen or naproxen to treat pain and inflammation, unless another medicine was  prescribed. If you can't take these medicines, acetaminophen may help with the pain, but does not treat inflammation. If you have chronic liver or kidney disease or ever had a stomach ulcer or gastrointestinal bleeding, talk with your doctor before using these medicines.    As your symptoms improve, begin gradual motion at the involved joint.  Follow-up care  Follow up with your healthcare provider if you are not improving after 5 days of treatment.  When to seek medical advice  Call your healthcare provider right away if any of these occur:    Redness over the painful area    Increasing pain or swelling at the joint    Fever (1 degree above your normal temperature) lasting 24 to 48 hours Or, whatever your healthcare provider told you to report based on your condition    8190-9860 [employer]. [address] [place] [address], [place] [address]. All rights reserved. This information is not intended as a substitute for professional medical care. Always follow your healthcare professional's instructions.              Follow-ups after your visit        Additional Services     ORTHO  REFERRAL       [facility] is referring you to the Orthopedic  Services at [facility].       The  Representative will assist you in the coordination of your Orthopedic and Musculoskeletal Care as prescribed by your physician.    The  Representative will call you within 1 business day to help schedule your appointment, or you may contact the  Representative at:    All areas ~ [phone]     Type of Referral : Non Surgical       Timeframe requested: Within 2 weeks    Coverage of these services is subject to the terms and limitations of your health insurance plan.  Please call member services at your health plan with any benefit or coverage questions.      If X-rays, CT or MRI's have been performed, please contact the facility where they were done to  arrange for , prior to your scheduled appointment.  Please bring this referral request to your appointment and present it to your specialist.                  Who to contact     If you have questions or need follow up information about today's clinic visit or your schedule please contact [facility] directly at [phone].  Normal or non-critical lab and imaging results will be communicated to you by MyChart, letter or phone within 4 business days after the clinic has received the results. If you do not hear from us within 7 days, please contact the clinic through [employer] or phone. If you have a critical or abnormal lab result, we will notify you by phone as soon as possible.  Submit refill requests through [employer] or call your pharmacy and they will forward the refill request to us. Please allow 3 business days for your refill to be completed.          Additional Information About Your Visit        MyChart Information     [employer] gives you secure access to your electronic health record. If you see a primary care provider, you can also send messages to your care team and make appointments. If you have questions, please call your primary care clinic.  If you do not have a primary care provider, please call [phone] and they will assist you.        Care EveryWhere ID     This is your Care EveryWhere ID. This could be used by other organizations to access your [facility] medical records  [id number]        Your Vitals Were     Pulse Temperature Pulse Oximetry             76 98.3  F (36.8  C) (Oral) 97%          Blood Pressure from Last 3 Encounters:   04/17/17 130/90   03/24/17 (!) 134/108   02/09/17 130/84    Weight from Last 3 Encounters:   03/24/17 280 lb (127 kg)   02/09/17 287 lb (130.2 kg)   12/27/16 290 lb 9.6 oz (131.8 kg)              We Performed the Following     ORTHO  REFERRAL          Today's Medication Changes          These changes are accurate as of: 4/17/17  5:14  PM.  If you have any questions, ask your nurse or doctor.               Start taking these medicines.        Dose/Directions    order for DME   Used for:  Right wrist pain   Started by:  Varghese Carrillo MD        Equipment being ordered: right wrist splint   Quantity:  1 each   Refills:  0            Where to get your medicines      Some of these will need a paper prescription and others can be bought over the counter.  Ask your nurse if you have questions.     Bring a paper prescription for each of these medications     order for DME                Primary Care Provider Office Phone # Fax #    Deacon Benson PA-C [phone]       [facility] [address]  [facility] [address]        Thank you!     Thank you for choosing [facility]  for your care. Our goal is always to provide you with excellent care. Hearing back from our patients is one way we can continue to improve our services. Please take a few minutes to complete the written survey that you may receive in the mail after your visit with us. Thank you!             Your Updated Medication List - Protect others around you: Learn how to safely use, store and throw away your medicines at www.disposemymeds.org.          This list is accurate as of: 4/17/17  5:14 PM.  Always use your most recent med list.                   Brand Name Dispense Instructions for use    albuterol 108 (90 BASE) MCG/ACT Inhaler    albuterol    1 Inhaler    Inhale 2 puffs into the lungs every 6 hours as needed for shortness of breath / dyspnea       busPIRone 5 MG tablet    BUSPAR    150 tablet    Start at 5 mg twice daily for 3 days, then 7.5 mg (1.5 tabs) twice daily for 3 days, then 10 mg (2 tabs) twice daily for 3 days, then 12.5 mg (2.5 tabs) twice daily for 3 days, then 15 mg (3 tabs) twice daily and stay at that dose       DULoxetine 60 MG EC capsule    CYMBALTA    90 capsule    Take 1 capsule (60 mg) by mouth daily        fluticasone 50 MCG/ACT spray    FLONASE    16 g    Spray 1-2 sprays into both nostrils daily       gabapentin 100 MG capsule    NEURONTIN    60 capsule    Take 1 capsule by mouth about 30 minutes prior to typical onset of restlessness. May increase by 1 capsule as needed every 3 days to max of 3.       guaiFENesin-codeine 100-10 MG/5ML Soln solution    ROBITUSSIN AC    120 mL    1-2 tsp PO qhs prn cough       LORazepam 0.5 MG tablet    ATIVAN    30 tablet    Take 1 tablet (0.5 mg) by mouth every 6 hours as needed for anxiety       losartan 100 MG tablet    COZAAR    90 tablet    Take 1 tablet (100 mg) by mouth daily       metFORMIN 500 MG 24 hr tablet    GLUCOPHAGE-XR    120 tablet    Take 2 tablets (1,000 mg) by mouth 2 times daily (with meals)       nebivolol 5 MG tablet    BYSTOLIC    30 tablet    Take 1 tablet (5 mg) by mouth daily       omeprazole 40 MG capsule    priLOSEC    60 capsule    Take 1 capsule (40 mg) by mouth 2 times daily Take 30-60 minutes before a meal.       order for DME     1 each    Equipment being ordered: right wrist splint       phentermine 37.5 MG tablet    ADIPEX-P    31 tablet    Take 1 tablet (37.5 mg) by mouth every morning (before breakfast)       traZODone 50 MG tablet    DESYREL    90 tablet    Take 3 tablets (150 mg) by mouth At Bedtime       vitamin D 2000 UNITS tablet     90 tablet    Take 2,000 Units by mouth daily

## 2017-04-17 NOTE — PATIENT INSTRUCTIONS
Knee Pain  Knee pain is very common. It s especially common in active people who put a lot of pressure on their knees, like runners. It affects women more often than men.  Your kneecap (patella) is a thick, round bone. It covers and protects the front portion of your knee joint. It moves along a groove in your thighbone (femur) as part of the patellofemoral joint. A layer of cartilage surrounds the underside of your kneecap. This layer protects it from grinding against your femur.  When this cartilage softens and breaks down, it can cause knee pain. This is partly because of repetitive stress. The stress irritates the lining of the joint. This causes pain in the underlying bone.  What causes knee pain?  Many things can cause knee pain. You may have more than one cause. Some of these include:    Overuse of the knee joint    The kneecap doesn t line up with the tissue around it    Damage to small nerves in the area    Damage to the ligament-like structure that holds the kneecap in place (retinaculum)    Breakdown of the bone under the cartilage    Swelling in the soft tissues around the kneecap    Injury  You might be more likely to have knee pain if you:    Exercise a lot    Recently increased the intensity of your workouts    Have a body mass index (BMI) greater than 25    Have poor alignment of your kneecap    Walk with your feet turned overly outward or inward    Have weakness in surrounding muscle groups (inner quad or hip adductor muscles)    Have too much tightness in surrounding muscle groups (hamstrings or iliotibial band)    Have a recent history of injury to the area    Are female  Symptoms of knee pain  This type of knee pain is a dull, aching pain in the front of the knee in the area under and around the kneecap. This pain may start quickly or slowly. Your pain might be worse when you squat, run, or sit for a long time. You might also sometimes feel like your knee is giving out. You may have symptoms in  one or both of your knees.  Diagnosing knee pain  Your health care provider will ask about your medical history and your symptoms. Be sure to describe any activities that make your knee pain worse. He or she will look at your knee. This will include tests of your range of motion, strength, and areas of pain of your knee. Your knee alignment will be checked.  Your health care provider will need to rule out other causes of your knee pain, such as arthritis. You may need an imaging test, such as an X-ray or MRI.  Treatment for knee pain  Treatments that can help ease your symptoms may include:    Avoiding activities for a while that make your pain worse, returning to activity over time    Icing the outside of your knee when it causes you pain    Taking over-the-counter pain medicine    Wearing a knee brace or taping your knee to support it    Wearing special shoe inserts to help keep your feet in the proper alignment    Doing special exercises to stretch and strengthen the muscles around your hip and your knee  These steps help most people manage knee pain. But some cases of knee pain need to be treated with surgery. You may need surgery right away. Or you may need it later if other treatments don t work. Your health care provider may refer you to an orthopedic surgeon. He or she will talk with you about your choices.  Preventing knee pain  Losing weight and correcting excess muscle tightness or muscle weakness may help lower your risk.  In some cases, you can prevent knee pain. To help prevent a flare-up of knee pain, you do these things:    Regularly do all the exercises your doctor or physical therapist advises    Support your knee as advised by your doctor or physical therapist    Increase training gradually, and ease up on training when needed    Have an expert check your gait for running or other sporting activities    Stretch properly before and after exercise    Replace your running shoes regularly    Lose  excess weight     When to call your health care provider  Call your health care provider right away if:    Your symptoms don t get better after a few weeks of treatment    You have any new symptoms        1821-6479 [employer]. [address] [place] [address], [place] [address]. All rights reserved. This information is not intended as a substitute for professional medical care. Always follow your healthcare professional's instructions.        Tendonitis  A tendon is the thick fibrous cord that joins muscle to bone and allows joints to move. When a tendon becomes inflamed, it is called tendonitis. This can occur from overuse, injury, or infection. This usually involves the shoulders, forearm, wrist, hands and foot. Symptoms include pain, swelling and tenderness to the touch. Moving the joint increases the pain.  It takes 4 to 6 weeks for tendonitis to heal. It is treated by preventing motion of the tendon with a splint or brace and the use of anti-inflammatory medicine.  Home care    Some people find relief with ice packs. These can be crushed or cubed ice in a plastic bag or a bag of frozen vegetables wrapped in a thin towel. Other people get better relief with heat. This can include a hot shower, hot bath, or a moist towel warmed in a microwave. Try each and use the method that feels best, for 15 to 20 minutes several times a day.    Rest the inflamed joint and protect it from movement.    You may use over-the-counter ibuprofen or naproxen to treat pain and inflammation, unless another medicine was prescribed. If you can't take these medicines, acetaminophen may help with the pain, but does not treat inflammation. If you have chronic liver or kidney disease or ever had a stomach ulcer or gastrointestinal bleeding, talk with your doctor before using these medicines.    As your symptoms improve, begin gradual motion at the involved joint.  Follow-up care  Follow up with your healthcare provider if you are not  improving after 5 days of treatment.  When to seek medical advice  Call your healthcare provider right away if any of these occur:    Redness over the painful area    Increasing pain or swelling at the joint    Fever (1 degree above your normal temperature) lasting 24 to 48 hours Or, whatever your healthcare provider told you to report based on your condition    4613-6364 [employer]. [address] [place] [address], [place] [address]. All rights reserved. This information is not intended as a substitute for professional medical care. Always follow your healthcare professional's instructions.

## 2017-04-18 NOTE — TELEPHONE ENCOUNTER
Left message with woman who answered the phone to have pt call back.    Nitesh Esteban   04/18/17

## 2017-04-19 NOTE — PROGRESS NOTES
"SUBJECTIVE:  Yuan Hensley, a 31 year old male scheduled an appointment to discuss the following issues:     Right wrist pain  Right knee pain, unspecified chronicity    Medical, social, surgical, and family histories reviewed.    Musculoskeletal Problem  Pain onR wrist and knuckles- swollen x3 days, R knee pain-when walking seems to locked up.      Pt tells me he has had knee problems for 16 years; mainly pain in the medial compartment of right knee, flexing is fine but extension sometimes feels like it's locking.  He played golf 3 days ago, felt his right 3rd knuckle \"cracked\" and hurt at the right wrist, thought he might have twisted it.  No open wound or paresthesia.    ROS:  See HPI.  No nausea/vomiting.  No fever/chills.  No chest pain/SOB.  No BM/urine problems.  No dizziness or syncope.      OBJECTIVE:  /90 (BP Location: Right arm, Patient Position: Chair, Cuff Size: Adult Large)  Pulse 76  Temp 98.3  F (36.8  C) (Oral)  SpO2 97%  EXAM:  GENERAL APPEARANCE: alert and mild distress  EYES: Eyes grossly normal to inspection, PERRL and conjunctivae and sclerae normal  HENT: ear canals and TM's normal and nose and mouth without ulcers or lesions  NECK: no adenopathy, no asymmetry, masses, or scars and thyroid normal to palpation  RESP: lungs clear to auscultation - no rales, rhonchi or wheezes  CV: regular rates and rhythm, normal S1 S2, no S3 or S4 and no murmur, click or rub  LYMPHATICS: normal ant/post cervical and supraclavicular nodes  ABDOMEN: soft, nontender, without hepatosplenomegaly or masses and bowel sounds normal  MS: extremities normal- no gross deformities noted; mild swelling dorsum right wrist without redness or increased temp; ROM wrist WNL, no snuff box tenderness; right knee---minimal effusion; no redness or increased temp; no ligament laxity, full flexion and extension able, some tenderness medial joint line.  ROM bilateral knees WNL.  Normal gait.  Neurovascularly intact " bilateral upper and lower extremities  SKIN: no suspicious lesions or rashes  NEURO: Normal strength and tone, mentation intact and speech normal    ASSESSMENT/PLAN:  (M25.531) Right wrist pain  (primary encounter diagnosis)  Comment:  Xray right hand negative;  Xray right knee negative for fracture or dislocation.  Plan: XR Knee Right 3 Views, XR Hand Right G/E 3         Views, order for DME, CANCELED: XR Wrist Right         G/E 3 Views        (M25.561) Right knee pain, unspecified chronicity  Plan: ORTHO  REFERRAL   Tylenol/Ibuprofen PRN.  Pt to f/up PCP if no improvement or worsening.  Warning signs and symptoms explained.

## 2017-04-25 ENCOUNTER — OFFICE VISIT (OUTPATIENT)
Dept: ORTHOPEDICS | Facility: CLINIC | Age: 32
End: 2017-04-25
Payer: MEDICARE

## 2017-04-25 VITALS
DIASTOLIC BLOOD PRESSURE: 98 MMHG | BODY MASS INDEX: 40.23 KG/M2 | HEIGHT: 70 IN | SYSTOLIC BLOOD PRESSURE: 126 MMHG | WEIGHT: 281 LBS

## 2017-04-25 DIAGNOSIS — M25.561 ACUTE PAIN OF RIGHT KNEE: Primary | ICD-10-CM

## 2017-04-25 DIAGNOSIS — E66.01 MORBID OBESITY, UNSPECIFIED OBESITY TYPE (H): ICD-10-CM

## 2017-04-25 PROCEDURE — 99203 OFFICE O/P NEW LOW 30 MIN: CPT | Performed by: PHYSICAL MEDICINE & REHABILITATION

## 2017-04-25 NOTE — MR AVS SNAPSHOT
After Visit Summary   4/25/2017    Yuan Hensley    MRN: [id number]           Patient Information     Date Of Birth          1985        Visit Information        Provider Department      4/25/2017 10:00 AM Kwesi Carrillo DO [facility]        Today's Diagnoses     Acute pain of right knee    -  1    Morbid obesity, unspecified obesity type (H)          Care Instructions    We addressed the following today:    1. Acute Right Knee Pain  2. Morbid Obesity    Activity modification as discussed    Physical therapy: [facility] - [phone]    Topical Treatments: Ice    Over the counter medication: Acetaminophen (Tylenol) 1000 mg every 6 hours with food (Maximum of 3000 mg/day)    Other specific instructions: Discussed importance of weight loss, including decreased oral intake and increased physical activity including aqua therapy, biking activities as tolerated, etc    Initiate use of over-the-counter Patellar Strap for symptomatic treatment    Call in 4 weeks after initiation of physical therapy if no improvement of symptoms for MRI of the right knee without contrast for further evaluation/medical care.  Instructed to follow-up if change of symptoms arise (sooner if needed; call direct clinic number [[phone]] at any time with questions or concerns)            Follow-ups after your visit        Additional Services     [facility] PT, HAND, AND CHIROPRACTIC REFERRAL       **This order will print in the [facility] Scheduling Office**    Physical Therapy, Hand Therapy and Chiropractic Care are available through:    *[facility]  *[facility]  *[facility]    Call one number to schedule at any of the above locations: [phone].    Your provider has referred you to: Physical Therapy at [facility] or [facility] - Aayush Ireland, Ashley Rodriguez, or Alexa Nelson    Indication/Reason for Referral: Knee Pain  Therapy Orders: Evaluate  and Treat  Special Programs: None  Special Request: None    Lauren Aguilar      Additional Comments for the Therapist or Chiropractor: Formal physical therapy - exercises to include closed kinetic chain VMO strengthening, hip abductor/adductor/external rotator/internal rotator strengthening, IT band, quadriceps, and hamstring stretching, and core strengthening with use of modalities/taping as appropriate with home exercise prescription.     Please be aware that coverage of these services is subject to the terms and limitations of your health insurance plan.  Call member services at your health plan with any benefit or coverage questions.      Please bring the following to your appointment:    *Your personal calendar for scheduling future appointments  *Comfortable clothing                  Who to contact     If you have questions or need follow up information about today's clinic visit or your schedule please contact [facility] directly at [phone].  Normal or non-critical lab and imaging results will be communicated to you by ams AGhart, letter or phone within 4 business days after the clinic has received the results. If you do not hear from us within 7 days, please contact the clinic through [employer] or phone. If you have a critical or abnormal lab result, we will notify you by phone as soon as possible.  Submit refill requests through [employer] or call your pharmacy and they will forward the refill request to us. Please allow 3 business days for your refill to be completed.          Additional Information About Your Visit        [employer] Information     [employer] gives you secure access to your electronic health record. If you see a primary care provider, you can also send messages to your care team and make appointments. If you have questions, please call your primary care clinic.  If you do not have a primary care provider, please call [phone] and they will assist you.        Care  "EveryWhere ID     This is your Care EveryWhere ID. This could be used by other organizations to access your [facility] medical records  [id number]        Your Vitals Were     Height BMI (Body Mass Index)                5' 10\" (1.778 m) 40.32 kg/m2           Blood Pressure from Last 3 Encounters:   04/25/17 (!) 126/98   04/17/17 130/90   03/24/17 (!) 134/108    Weight from Last 3 Encounters:   04/25/17 281 lb (127.5 kg)   03/24/17 280 lb (127 kg)   02/09/17 287 lb (130.2 kg)              We Performed the Following     [facility] PT, HAND, AND CHIROPRACTIC REFERRAL        Primary Care Provider Office Phone # Fax #    Deacon Benson PA-C [phone]       [place] [id number]  [facility] [address]        Thank you!     Thank you for choosing [facility]  for your care. Our goal is always to provide you with excellent care. Hearing back from our patients is one way we can continue to improve our services. Please take a few minutes to complete the written survey that you may receive in the mail after your visit with us. Thank you!             Your Updated Medication List - Protect others around you: Learn how to safely use, store and throw away your medicines at www.disposemymeds.org.          This list is accurate as of: 4/25/17  1:42 PM.  Always use your most recent med list.                   Brand Name Dispense Instructions for use    albuterol 108 (90 BASE) MCG/ACT Inhaler    albuterol    1 Inhaler    Inhale 2 puffs into the lungs every 6 hours as needed for shortness of breath / dyspnea       busPIRone 5 MG tablet    BUSPAR    150 tablet    Start at 5 mg twice daily for 3 days, then 7.5 mg (1.5 tabs) twice daily for 3 days, then 10 mg (2 tabs) twice daily for 3 days, then 12.5 mg (2.5 tabs) twice daily for 3 days, then 15 mg (3 tabs) twice daily and stay at that dose       DULoxetine 60 MG EC capsule    CYMBALTA    90 capsule    Take 1 capsule (60 mg) by " mouth daily       fluticasone 50 MCG/ACT spray    FLONASE    16 g    Spray 1-2 sprays into both nostrils daily       gabapentin 100 MG capsule    NEURONTIN    60 capsule    Take 1 capsule by mouth about 30 minutes prior to typical onset of restlessness. May increase by 1 capsule as needed every 3 days to max of 3.       guaiFENesin-codeine 100-10 MG/5ML Soln solution    ROBITUSSIN AC    120 mL    1-2 tsp PO qhs prn cough       LORazepam 0.5 MG tablet    ATIVAN    30 tablet    Take 1 tablet (0.5 mg) by mouth every 6 hours as needed for anxiety       losartan 100 MG tablet    COZAAR    90 tablet    Take 1 tablet (100 mg) by mouth daily       metFORMIN 500 MG 24 hr tablet    GLUCOPHAGE-XR    120 tablet    Take 2 tablets (1,000 mg) by mouth 2 times daily (with meals)       nebivolol 5 MG tablet    BYSTOLIC    30 tablet    Take 1 tablet (5 mg) by mouth daily       omeprazole 40 MG capsule    priLOSEC    60 capsule    Take 1 capsule (40 mg) by mouth 2 times daily Take 30-60 minutes before a meal.       order for DME     1 each    Equipment being ordered: right wrist splint       phentermine 37.5 MG tablet    ADIPEX-P    31 tablet    Take 1 tablet (37.5 mg) by mouth every morning (before breakfast)       traZODone 50 MG tablet    DESYREL    90 tablet    Take 3 tablets (150 mg) by mouth At Bedtime       vitamin D 2000 UNITS tablet     90 tablet    Take 2,000 Units by mouth daily

## 2017-04-25 NOTE — PATIENT INSTRUCTIONS
We addressed the following today:    1. Acute Right Knee Pain  2. Morbid Obesity    Activity modification as discussed    Physical therapy: [facility] - [phone]    Topical Treatments: Ice    Over the counter medication: Acetaminophen (Tylenol) 1000 mg every 6 hours with food (Maximum of 3000 mg/day)    Other specific instructions: Discussed importance of weight loss, including decreased oral intake and increased physical activity including aqua therapy, biking activities as tolerated, etc    Initiate use of over-the-counter Patellar Strap for symptomatic treatment    Call in 4 weeks after initiation of physical therapy if no improvement of symptoms for MRI of the right knee without contrast for further evaluation/medical care.  Instructed to follow-up if change of symptoms arise (sooner if needed; call direct clinic number [[phone]] at any time with questions or concerns)

## 2017-04-25 NOTE — NURSING NOTE
"Chief Complaint   Patient presents with     Knee Pain     Right       Initial BP (!) 126/98 (BP Location: Right arm, Patient Position: Chair, Cuff Size: Adult Large)  Ht 5' 10\" (1.778 m)  Wt 281 lb (127.5 kg)  BMI 40.32 kg/m2 Estimated body mass index is 40.32 kg/(m^2) as calculated from the following:    Height as of this encounter: 5' 10\" (1.778 m).    Weight as of this encounter: 281 lb (127.5 kg).  Medication Reconciliation: complete       Bright Maria ATC      "

## 2017-04-25 NOTE — Clinical Note
Dear Yuan eKene saw me at [facility] on Apr 25, 2017.  Please refer to the visit note at your convenience and feel free to contact me should you have any questions.  Sincerely,  Kwesi Carrillo DO, [facility]

## 2017-04-25 NOTE — PROGRESS NOTES
" [facility]   Clinic Visit s Apr 25, 2017    Subjective:  Yuan Hensley is a 31 year old male who is seen in consultation at the request of Dr. Carrillo for evaluation of acute right knee pain.    Symptoms began 2 months ago. Reports insidious onset without acute precipitating event. Reports right knee pain that is located medial and anterior. Pain is 8/10 in maximal severity and 2/10 currently. Symptoms are worse with transition from right knee flexion to extension activities and with standing and better with use of ice.  Other treatment has consisted of nonsteroidal anti-inflammatory medications with minimal relief.  Reports locking and swelling of the right knee. Denies any weakness, popping, catching, clicking, or bruising of the right knee.  Reports previous knee injury in high school, untreated at that time. Denies any previous right knee surgeries.    Patient's past medical, surgical, social, and family histories are reviewed today.  Significant contributory medical issues include fibromyalgia and morbid obesity  Past Medical History:   Diagnosis Date     Depressive disorder      Fibromyalgia      Gastro-oesophageal reflux disease      Hypertension        Review of Systems:  Constitutional: POSITIVE for chills and NEGATIVE for fever or change in weight  Skin: NEGATIVE for worrisome rashes, moles, or lesions  Neuro: NEGATIVE for weakness of the right lower extremity  MSK: see HPI  Additional 10 point ROS is negative other than symptoms noted above and in HPI    This document serves as a record of the services and decisions personally performed and made by Kwesi Carrillo DO. It was created on his behalf by Dwayne Reed, a trained medical scribe. The creation of this document is based the provider's statements to the medical scribe.  Dwayne Reed April 25, 2017 10:23 AM      Objective:  BP (!) 126/98 (BP Location: Right arm, Patient Position: Chair, Cuff Size: Adult Large)  Ht 1.778 m (5' 10\")  " Wt 127.5 kg (281 lb)  BMI 40.32 kg/m2  General: morbidly obese, alert, and in mild distress  Skin: no suspicious lesions or rashes  Psych: mentation appears normal and affect normal/bright  HEENT: no scleral icterus  CV: no pedal edema  Resp: normal respiratory effort without conversational dyspnea   Neuro: motor strength as noted below  Lymph: no palpable lymphadenopathy    MSK:    RIGHT KNEE  Inspection:    Normal alignment with no edema, erythema, or ecchymosis present  Palpation:    Tender about the patella tendon and medial joint line    Not tender over the lateral joint line, medial tibial plateau, lateral tibial plateau, medial femoral condyle, lateral femoral condyle, prepatellar bursa, or pes anserine bursa    No effusion is present    Patellofemoral crepitus is absent    Passive Range of Motion:    -50 extension to 1250 flexion limited by pain  Strength:    Quadriceps 4/5 and painful  Special Tests:    Positive: Single leg squat, patellar apprehension, patellar grind, and Anna's     Negative: Ballottement, quadriceps active test, bounce home, MCL testing, LCL testing, Lachman's, Sag sign, and posterior drawer testing    Contralateral knee: no overlying skin change, observable deformity, or effusion    Imaging:  No x-rays indicated during today's clinical visit   Previous films were reviewed today, independent visualization of images was performed, and results were discussed with the patient  Right knee x-rays - 4/17/2017  Impression:  1. Possible early narrowing of the medial joint line.  2. Native for fracture, subluxation, or other acute osseous abnormalities.    ASSESSMENT:  1. Acute right knee pain - differential diagnoses includes patellar tendinopathy, fat pad impingement, patellofemoral pain syndrome, meniscal derangement, secondary knee osteoarthrosis, etc.  2. Morbid obesity    PLAN:  1. Acetaminophen/ice as needed for improved pain control.  2. Activity modification as discussed, including  limitation of activities that cause pain/discomfort.  3. Initiate use of over-the-counter patellar strap as discussed for treatment purposes.  4. Formal physical therapy - exercises to include closed kinetic chain VMO strengthening, hip abductor/adductor/external rotator/internal rotator strengthening, IT band, quadriceps, and hamstring stretching, and core strengthening with use of modalities/taping as appropriate with home exercise prescription.  5. Discussed importance of weight loss, including decreased oral intake and increased physical activity including aqua therapy, biking activities as tolerated, etc.  6. Call in 4 weeks after initiation of physical therapy if no improvement of symptoms for MRI of the right knee without contrast for further evaluation/medical care.  Instructed to follow-up if change of symptoms arise.    Patient's conditions were thoroughly discussed during today's visit with greater than 50% of the visit spent counseling the patient with total time spent face-to-face with the patient being 20 minutes.    Kwesi Carrillo DO, CAM  [facility]    Disclaimer: This note consists of symbols derived from keyboarding, dictation and/or voice recognition software. As a result, there may be errors in the script that have gone undetected. Please consider this when interpreting information found in this chart.

## 2017-05-02 ENCOUNTER — THERAPY VISIT (OUTPATIENT)
Dept: PHYSICAL THERAPY | Facility: CLINIC | Age: 32
End: 2017-05-02
Payer: MEDICARE

## 2017-05-02 DIAGNOSIS — M25.561 CHRONIC PAIN OF RIGHT KNEE: Primary | ICD-10-CM

## 2017-05-02 DIAGNOSIS — G89.29 CHRONIC PAIN OF RIGHT KNEE: Primary | ICD-10-CM

## 2017-05-02 PROCEDURE — 97110 THERAPEUTIC EXERCISES: CPT | Mod: GP | Performed by: PHYSICAL THERAPIST

## 2017-05-02 PROCEDURE — G8979 MOBILITY GOAL STATUS: HCPCS | Mod: GP | Performed by: PHYSICAL THERAPIST

## 2017-05-02 PROCEDURE — 97161 PT EVAL LOW COMPLEX 20 MIN: CPT | Mod: GP | Performed by: PHYSICAL THERAPIST

## 2017-05-02 PROCEDURE — G8978 MOBILITY CURRENT STATUS: HCPCS | Mod: GP | Performed by: PHYSICAL THERAPIST

## 2017-05-02 ASSESSMENT — ACTIVITIES OF DAILY LIVING (ADL)
GO DOWN STAIRS: ACTIVITY IS SOMEWHAT DIFFICULT
RAW_SCORE: 48.46
SIT WITH YOUR KNEE BENT: ACTIVITY IS NOT DIFFICULT
LIMPING: I HAVE THE SYMPTOM BUT IT DOES NOT AFFECT MY ACTIVITY
AS_A_RESULT_OF_YOUR_KNEE_INJURY,_HOW_WOULD_YOU_RATE_YOUR_CURRENT_LEVEL_OF_DAILY_ACTIVITY?: NORMAL
WEAKNESS: I DO NOT HAVE THE SYMPTOM
GIVING WAY, BUCKLING OR SHIFTING OF KNEE: THE SYMPTOM AFFECTS MY ACTIVITY SLIGHTLY
HOW_WOULD_YOU_RATE_THE_CURRENT_FUNCTION_OF_YOUR_KNEE_DURING_YOUR_USUAL_DAILY_ACTIVITIES_ON_A_SCALE_FROM_0_TO_100_WITH_100_BEING_YOUR_LEVEL_OF_KNEE_FUNCTION_PRIOR_TO_YOUR_INJURY_AND_0_BEING_THE_INABILITY_TO_PERFORM_ANY_OF_YOUR_USUAL_DAILY_ACTIVITIES?: 85
KNEE_ACTIVITY_OF_DAILY_LIVING_SUM: 45
STAND: ACTIVITY IS NOT DIFFICULT
STIFFNESS: I HAVE THE SYMPTOM BUT IT DOES NOT AFFECT MY ACTIVITY
RISE FROM A CHAIR: ACTIVITY IS SOMEWHAT DIFFICULT
WALK: NOT ANSWERED
GO UP STAIRS: ACTIVITY IS SOMEWHAT DIFFICULT
KNEE_ACTIVITY_OF_DAILY_LIVING_SCORE: 69.23
HOW_WOULD_YOU_RATE_THE_OVERALL_FUNCTION_OF_YOUR_KNEE_DURING_YOUR_USUAL_DAILY_ACTIVITIES?: NORMAL
PAIN: THE SYMPTOM AFFECTS MY ACTIVITY SLIGHTLY
KNEEL ON THE FRONT OF YOUR KNEE: ACTIVITY IS VERY DIFFICULT
SQUAT: ACTIVITY IS SOMEWHAT DIFFICULT
SWELLING: THE SYMPTOM AFFECTS MY ACTIVITY SLIGHTLY

## 2017-05-02 NOTE — MR AVS SNAPSHOT
After Visit Summary   5/2/2017    Yuan Hensley    MRN: [id number]           Patient Information     Date Of Birth          1985        Visit Information        Provider Department      5/2/2017 11:00 AM Rashaun Ireland, [facility] Physical Therapy        Today's Diagnoses     Chronic pain of right knee    -  1       Follow-ups after your visit        Your next 10 appointments already scheduled     May 16, 2017 10:20 AM CDT   JOHN Extremity with Rashaun Ireland PT   [facility] Physical Therapy ([facility])    [address]  [facility] [id number] [phone]              Who to contact     If you have questions or need follow up information about today's clinic visit or your schedule please contact [facility] PHYSICAL THERAPY directly at [phone].  Normal or non-critical lab and imaging results will be communicated to you by MyChart, letter or phone within 4 business days after the clinic has received the results. If you do not hear from us within 7 days, please contact the clinic through [employer] or phone. If you have a critical or abnormal lab result, we will notify you by phone as soon as possible.  Submit refill requests through [employer] or call your pharmacy and they will forward the refill request to us. Please allow 3 business days for your refill to be completed.          Additional Information About Your Visit        MyChart Information     [employer] gives you secure access to your electronic health record. If you see a primary care provider, you can also send messages to your care team and make appointments. If you have questions, please call your primary care clinic.  If you do not have a primary care provider, please call [phone] and they will assist you.        Care EveryWhere ID     This is your Care EveryWhere ID. This could be used by other  organizations to access your [place] medical records  [id number]         Blood Pressure from Last 3 Encounters:   04/25/17 (!) 126/98   04/17/17 130/90   03/24/17 (!) 134/108    Weight from Last 3 Encounters:   04/25/17 127.5 kg (281 lb)   03/24/17 127 kg (280 lb)   02/09/17 130.2 kg (287 lb)              We Performed the Following     HC PT EVAL, LOW COMPLEXITY     JOHN INITIAL EVAL REPORT     THERAPEUTIC EXERCISES        Primary Care Provider Office Phone # Fax #    Deacon Benson PA-C [phone]       [place] [address]  [facility] [address]        Thank you!     Thank you for choosing [place] [facility] PHYSICAL THERAPY  for your care. Our goal is always to provide you with excellent care. Hearing back from our patients is one way we can continue to improve our services. Please take a few minutes to complete the written survey that you may receive in the mail after your visit with us. Thank you!             Your Updated Medication List - Protect others around you: Learn how to safely use, store and throw away your medicines at www.disposemymeds.org.          This list is accurate as of: 5/2/17 11:31 AM.  Always use your most recent med list.                   Brand Name Dispense Instructions for use    albuterol 108 (90 BASE) MCG/ACT Inhaler    albuterol    1 Inhaler    Inhale 2 puffs into the lungs every 6 hours as needed for shortness of breath / dyspnea       busPIRone 5 MG tablet    BUSPAR    150 tablet    Start at 5 mg twice daily for 3 days, then 7.5 mg (1.5 tabs) twice daily for 3 days, then 10 mg (2 tabs) twice daily for 3 days, then 12.5 mg (2.5 tabs) twice daily for 3 days, then 15 mg (3 tabs) twice daily and stay at that dose       DULoxetine 60 MG EC capsule    CYMBALTA    90 capsule    Take 1 capsule (60 mg) by mouth daily       fluticasone 50 MCG/ACT spray    FLONASE    16 g    Spray 1-2 sprays into both nostrils daily        gabapentin 100 MG capsule    NEURONTIN    60 capsule    Take 1 capsule by mouth about 30 minutes prior to typical onset of restlessness. May increase by 1 capsule as needed every 3 days to max of 3.       guaiFENesin-codeine 100-10 MG/5ML Soln solution    ROBITUSSIN AC    120 mL    1-2 tsp PO qhs prn cough       LORazepam 0.5 MG tablet    ATIVAN    30 tablet    Take 1 tablet (0.5 mg) by mouth every 6 hours as needed for anxiety       losartan 100 MG tablet    COZAAR    90 tablet    Take 1 tablet (100 mg) by mouth daily       metFORMIN 500 MG 24 hr tablet    GLUCOPHAGE-XR    120 tablet    Take 2 tablets (1,000 mg) by mouth 2 times daily (with meals)       nebivolol 5 MG tablet    BYSTOLIC    30 tablet    Take 1 tablet (5 mg) by mouth daily       omeprazole 40 MG capsule    priLOSEC    60 capsule    Take 1 capsule (40 mg) by mouth 2 times daily Take 30-60 minutes before a meal.       order for DME     1 each    Equipment being ordered: right wrist splint       phentermine 37.5 MG tablet    ADIPEX-P    31 tablet    Take 1 tablet (37.5 mg) by mouth every morning (before breakfast)       traZODone 50 MG tablet    DESYREL    90 tablet    Take 3 tablets (150 mg) by mouth At Bedtime       vitamin D 2000 UNITS tablet     90 tablet    Take 2,000 Units by mouth daily

## 2017-05-02 NOTE — LETTER
DEPARTMENT OF HEALTH AND HUMAN SERVICES  CENTERS FOR MEDICARE & MEDICAID SERVICES    PLAN/UPDATED PLAN OF PROGRESS FOR OUTPATIENT REHABILITATION    PATIENTS NAME:  Yuan Hensley   : 1985  PROVIDER NUMBER:    [id number]  [id number]   PROVIDER NAME: [place] [facility] PHYSICAL THERAPY  MEDICAL RECORD NUMBER: [id number]   START OF CARE DATE:  17   TYPE:  PT  PRIMARY/TREATMENT DIAGNOSIS: Chronic pain of right knee  VISITS FROM START OF CARE:  Rxs Used: 1     Subjective:    Yuan Hensley is a 31 year old male with a right knee condition.  Condition occurred with:  Insidious onset.  Condition occurred: for unknown reasons.  This is a recurrent condition  MD appt on 17.  Patient reports pain:  Anterior.  Radiates to:  Knee.  Pain is described as aching and is intermittent and reported as 2/10.  Associated symptoms:  Loss of motion/stiffness and loss of strength. Pain is worse during the day.  Symptoms are exacerbated by ascending stairs, descending stairs, walking, standing and sitting and relieved by rest.  Since onset symptoms are unchanged.  Special tests:  X-ray (mild arthritis).  Previous treatment includes physical therapy.  There was mild improvement following previous treatment.  General health as reported by patient is good.  Pertinent medical history includes:  High blood pressure, depression and sleep disorder/apnea.  Medical allergies: yes (ceclor).  Other surgeries include:  Orthopedic surgery (shoulder).  Primary job tasks include:  Prolonged standing and lifting.  Barriers include:  None as reported by the patient.  Red flags:  None as reported by the patient.               Objective:    Flexibility/Screens:   Positive screens:  Knee    Knee Evaluation:  ROM:    Pain: ERP with full ext and flexion  Strength:   Extension:  Left: 4/5    Pain:-      Right: 3+/5    Pain:+  Flexion:  Left: 4/5    Pain:-      Right: 3+/5    Pain:+/-    Quad Set Left: Fair     Pain:   Quad Set Right: Fair    Pain:  Ligament Testing:  Normal  Special Tests: Normal  Palpation:    Left knee tenderness not present at:  Medial Joint Line; Lateral Joint Line; Patellar Tendon; Popliteal; Patellar Medial; Patellar Lateral; Patellar Superior and Patellar Inferior  Right knee tenderness present at:  Medial Joint Line; Patellar Medial and Patellar Inferior  Right knee tenderness not present at:  Lateral Joint Line; Patellar Tendon; Popliteal; Patellar Lateral and Patellar Superior  Edema:  Normal  Mobility Testing:  Normal    PATIENTS NAME:  Yuan Hensley   : 1985        Assessment/Plan:      Patient is a 31 year old male with right side knee complaints.    Patient has the following significant findings with corresponding treatment plan.                Diagnosis 1:  R knee pain  Pain -  self management, education, directional preference exercise and home program  Decreased strength - therapeutic exercise and therapeutic activities  Impaired muscle performance - neuro re-education  Decreased function - therapeutic activities    Therapy Evaluation Codes:   1) History comprised of:   Personal factors that impact the plan of care:      Overall behavior pattern.    Comorbidity factors that impact the plan of care are:      Depression, High blood pressure and Sleep disorder/apnea.     Medications impacting care: Anti-depressant, High blood pressure and Sleep.  2) Examination of Body Systems comprised of:   Body structures and functions that impact the plan of care:      Knee.   Activity limitations that impact the plan of care are:      Bending, Lifting, Stairs and Standing.  3) Clinical presentation characteristics are:   Stable/Uncomplicated.  4) Decision-Making    Low complexity using standardized patient assessment instrument and/or measureable assessment of functional outcome.  Cumulative Therapy Evaluation is: Low complexity.    Previous and current functional limitations:  (See Goal  "Flow Sheet for this information)    Short term and Long term goals: (See Goal Flow Sheet for this information)     Communication ability:  Patient appears to be able to clearly communicate and understand verbal and written communication and follow directions correctly.  Treatment Explanation - The following has been discussed with the patient:   RX ordered/plan of care  Anticipated outcomes  Possible risks and side effects  This patient would benefit from PT intervention to resume normal activities.   Rehab potential is fair.    Frequency:  1 X week, once daily  Duration:  for 6 weeks  Discharge Plan:  Achieve all LTG.  Independent in home treatment program.  Reach maximal therapeutic benefit.          PATIENTS NAME:  Yuan Hensley   : 1985            Caregiver Signature/Credentials _____________________________ Date ________       Treating Provider: Rashaun Ireland, PT    I have reviewed and certified the need for these services and plan of treatment while under my care.        PHYSICIAN'S SIGNATURE:   _________________________________________  Date___________   Kwesi Carrillo MD    Certification period:  Beginning of Cert date period: 17 to  End of Cert period date: 17     Functional Level Progress Report: Please see attached \"Goal Flow sheet for Functional level.\"    ____X____ Continue Services or       ________ DC Services                Service dates: From  SOC Date: 17 date to present                         "

## 2017-05-02 NOTE — PROGRESS NOTES
Subjective:    Patient is a 31 year old male presenting with rehab left knee hpi.   Yuan Hensley is a 31 year old male with a right knee condition.  Condition occurred with:  Insidious onset.  Condition occurred: for unknown reasons.  This is a recurrent condition  MD appt on 4/25/17.  .    Patient reports pain:  Anterior.  Radiates to:  Knee.  Pain is described as aching and is intermittent and reported as 2/10.  Associated symptoms:  Loss of motion/stiffness and loss of strength. Pain is worse during the day.  Symptoms are exacerbated by ascending stairs, descending stairs, walking, standing and sitting and relieved by rest.  Since onset symptoms are unchanged.  Special tests:  X-ray (mild arthritis).  Previous treatment includes physical therapy.  There was mild improvement following previous treatment.  General health as reported by patient is good.  Pertinent medical history includes:  High blood pressure, depression and sleep disorder/apnea.  Medical allergies: yes (ceclor).  Other surgeries include:  Orthopedic surgery (shoulder).        Primary job tasks include:  Prolonged standing and lifting.    Barriers include:  None as reported by the patient.    Red flags:  None as reported by the patient.                        Objective:          Flexibility/Screens:   Positive screens:  Knee                                                          Knee Evaluation:  ROM:        Pain: ERP with full ext and flexion    Strength:     Extension:  Left: 4/5    Pain:-      Right: 3+/5    Pain:+  Flexion:  Left: 4/5    Pain:-      Right: 3+/5    Pain:+/-    Quad Set Left: Fair    Pain:   Quad Set Right: Fair    Pain:  Ligament Testing:  Normal                Special Tests: Normal      Palpation:      Left knee tenderness not present at:  Medial Joint Line; Lateral Joint Line; Patellar Tendon; Popliteal; Patellar Medial; Patellar Lateral; Patellar Superior and Patellar Inferior  Right knee tenderness present at:  Medial  Joint Line; Patellar Medial and Patellar Inferior  Right knee tenderness not present at:  Lateral Joint Line; Patellar Tendon; Popliteal; Patellar Lateral and Patellar Superior  Edema:  Normal    Mobility Testing:  Normal                  General     ROS    Assessment/Plan:      Patient is a 31 year old male with right side knee complaints.    Patient has the following significant findings with corresponding treatment plan.                Diagnosis 1:  R knee pain  Pain -  self management, education, directional preference exercise and home program  Decreased strength - therapeutic exercise and therapeutic activities  Impaired muscle performance - neuro re-education  Decreased function - therapeutic activities    Therapy Evaluation Codes:   1) History comprised of:   Personal factors that impact the plan of care:      Overall behavior pattern.    Comorbidity factors that impact the plan of care are:      Depression, High blood pressure and Sleep disorder/apnea.     Medications impacting care: Anti-depressant, High blood pressure and Sleep.  2) Examination of Body Systems comprised of:   Body structures and functions that impact the plan of care:      Knee.   Activity limitations that impact the plan of care are:      Bending, Lifting, Stairs and Standing.  3) Clinical presentation characteristics are:   Stable/Uncomplicated.  4) Decision-Making    Low complexity using standardized patient assessment instrument and/or measureable assessment of functional outcome.  Cumulative Therapy Evaluation is: Low complexity.    Previous and current functional limitations:  (See Goal Flow Sheet for this information)    Short term and Long term goals: (See Goal Flow Sheet for this information)     Communication ability:  Patient appears to be able to clearly communicate and understand verbal and written communication and follow directions correctly.  Treatment Explanation - The following has been discussed with the patient:   RX  ordered/plan of care  Anticipated outcomes  Possible risks and side effects  This patient would benefit from PT intervention to resume normal activities.   Rehab potential is fair.    Frequency:  1 X week, once daily  Duration:  for 6 weeks  Discharge Plan:  Achieve all LTG.  Independent in home treatment program.  Reach maximal therapeutic benefit.    Please refer to the daily flowsheet for treatment today, total treatment time and time spent performing 1:1 timed codes.

## 2017-05-16 ENCOUNTER — THERAPY VISIT (OUTPATIENT)
Dept: PHYSICAL THERAPY | Facility: CLINIC | Age: 32
End: 2017-05-16
Payer: MEDICARE

## 2017-05-16 DIAGNOSIS — G89.29 CHRONIC PAIN OF RIGHT KNEE: ICD-10-CM

## 2017-05-16 DIAGNOSIS — M25.561 CHRONIC PAIN OF RIGHT KNEE: ICD-10-CM

## 2017-05-16 PROCEDURE — 97110 THERAPEUTIC EXERCISES: CPT | Mod: GP | Performed by: PHYSICAL THERAPIST

## 2017-05-16 PROCEDURE — 97112 NEUROMUSCULAR REEDUCATION: CPT | Mod: GP | Performed by: PHYSICAL THERAPIST

## 2017-05-16 NOTE — MR AVS SNAPSHOT
After Visit Summary   5/16/2017    Yuan Hensley    MRN: [id number]           Patient Information     Date Of Birth          1985        Visit Information        Provider Department      5/16/2017 10:20 AM Rashaun Ireland PT [facility] Physical Therapy        Today's Diagnoses     Chronic pain of right knee           Follow-ups after your visit        Your next 10 appointments already scheduled     May 30, 2017  9:40 AM CDT   JOHN Extremity with Rashaun Ireland PT   [facility] Physical Therapy ([facility])    [address]  [facility] [id number] [phone]              Who to contact     If you have questions or need follow up information about today's clinic visit or your schedule please contact [facility] ATHLETIC [facility] PHYSICAL THERAPY directly at [phone].  Normal or non-critical lab and imaging results will be communicated to you by MyChart, letter or phone within 4 business days after the clinic has received the results. If you do not hear from us within 7 days, please contact the clinic through [employer] or phone. If you have a critical or abnormal lab result, we will notify you by phone as soon as possible.  Submit refill requests through [employer] or call your pharmacy and they will forward the refill request to us. Please allow 3 business days for your refill to be completed.          Additional Information About Your Visit        MyChart Information     [employer] gives you secure access to your electronic health record. If you see a primary care provider, you can also send messages to your care team and make appointments. If you have questions, please call your primary care clinic.  If you do not have a primary care provider, please call [phone] and they will assist you.        Care EveryWhere ID     This is your Care EveryWhere ID. This could be used by other  organizations to access your [place] medical records  [id number]         Blood Pressure from Last 3 Encounters:   04/25/17 (!) 126/98   04/17/17 130/90   03/24/17 (!) 134/108    Weight from Last 3 Encounters:   04/25/17 127.5 kg (281 lb)   03/24/17 127 kg (280 lb)   02/09/17 130.2 kg (287 lb)              We Performed the Following     NEUROMUSCULAR RE-EDUCATION     THERAPEUTIC EXERCISES        Primary Care Provider Office Phone # Fax #    Deacon Benson PA-C [phone]       [place] [address]  [facility] [address]        Thank you!     Thank you for choosing [facility] FOR ATHLETIC MEDICINE [facility] PHYSICAL THERAPY  for your care. Our goal is always to provide you with excellent care. Hearing back from our patients is one way we can continue to improve our services. Please take a few minutes to complete the written survey that you may receive in the mail after your visit with us. Thank you!             Your Updated Medication List - Protect others around you: Learn how to safely use, store and throw away your medicines at www.disposemymeds.org.          This list is accurate as of: 5/16/17 11:01 AM.  Always use your most recent med list.                   Brand Name Dispense Instructions for use    albuterol 108 (90 BASE) MCG/ACT Inhaler    albuterol    1 Inhaler    Inhale 2 puffs into the lungs every 6 hours as needed for shortness of breath / dyspnea       busPIRone 5 MG tablet    BUSPAR    150 tablet    Start at 5 mg twice daily for 3 days, then 7.5 mg (1.5 tabs) twice daily for 3 days, then 10 mg (2 tabs) twice daily for 3 days, then 12.5 mg (2.5 tabs) twice daily for 3 days, then 15 mg (3 tabs) twice daily and stay at that dose       DULoxetine 60 MG EC capsule    CYMBALTA    90 capsule    Take 1 capsule (60 mg) by mouth daily       fluticasone 50 MCG/ACT spray    FLONASE    16 g    Spray 1-2 sprays into both nostrils daily       gabapentin 100 MG  capsule    NEURONTIN    60 capsule    Take 1 capsule by mouth about 30 minutes prior to typical onset of restlessness. May increase by 1 capsule as needed every 3 days to max of 3.       guaiFENesin-codeine 100-10 MG/5ML Soln solution    ROBITUSSIN AC    120 mL    1-2 tsp PO qhs prn cough       LORazepam 0.5 MG tablet    ATIVAN    30 tablet    Take 1 tablet (0.5 mg) by mouth every 6 hours as needed for anxiety       losartan 100 MG tablet    COZAAR    90 tablet    Take 1 tablet (100 mg) by mouth daily       metFORMIN 500 MG 24 hr tablet    GLUCOPHAGE-XR    120 tablet    Take 2 tablets (1,000 mg) by mouth 2 times daily (with meals)       nebivolol 5 MG tablet    BYSTOLIC    30 tablet    Take 1 tablet (5 mg) by mouth daily       omeprazole 40 MG capsule    priLOSEC    60 capsule    Take 1 capsule (40 mg) by mouth 2 times daily Take 30-60 minutes before a meal.       order for DME     1 each    Equipment being ordered: right wrist splint       phentermine 37.5 MG tablet    ADIPEX-P    31 tablet    Take 1 tablet (37.5 mg) by mouth every morning (before breakfast)       traZODone 50 MG tablet    DESYREL    90 tablet    Take 3 tablets (150 mg) by mouth At Bedtime       vitamin D 2000 UNITS tablet     90 tablet    Take 2,000 Units by mouth daily

## 2017-06-13 PROBLEM — M25.561 CHRONIC PAIN OF RIGHT KNEE: Status: RESOLVED | Noted: 2017-05-02 | Resolved: 2017-06-13

## 2017-06-13 PROBLEM — G89.29 CHRONIC PAIN OF RIGHT KNEE: Status: RESOLVED | Noted: 2017-05-02 | Resolved: 2017-06-13

## 2017-06-28 ENCOUNTER — APPOINTMENT (OUTPATIENT)
Dept: GENERAL RADIOLOGY | Facility: CLINIC | Age: 32
End: 2017-06-28
Attending: EMERGENCY MEDICINE
Payer: MEDICARE

## 2017-06-28 ENCOUNTER — HOSPITAL ENCOUNTER (EMERGENCY)
Facility: CLINIC | Age: 32
Discharge: HOME | End: 2017-06-29
Attending: EMERGENCY MEDICINE | Admitting: EMERGENCY MEDICINE
Payer: MEDICARE

## 2017-06-28 DIAGNOSIS — R07.89 OTHER CHEST PAIN: ICD-10-CM

## 2017-06-28 LAB — TROPONIN I BLD-MCNC: 0 UG/L (ref 0–0.1)

## 2017-06-28 PROCEDURE — 96374 THER/PROPH/DIAG INJ IV PUSH: CPT

## 2017-06-28 PROCEDURE — 84484 ASSAY OF TROPONIN QUANT: CPT

## 2017-06-28 PROCEDURE — 71020 XR CHEST 2 VW: CPT

## 2017-06-28 PROCEDURE — 25000128 H RX IP 250 OP 636: Performed by: EMERGENCY MEDICINE

## 2017-06-28 PROCEDURE — 93005 ELECTROCARDIOGRAM TRACING: CPT

## 2017-06-28 PROCEDURE — 99285 EMERGENCY DEPT VISIT HI MDM: CPT | Mod: 25

## 2017-06-28 RX ORDER — KETOROLAC TROMETHAMINE 15 MG/ML
15 INJECTION, SOLUTION INTRAMUSCULAR; INTRAVENOUS ONCE
Status: COMPLETED | OUTPATIENT
Start: 2017-06-28 | End: 2017-06-28

## 2017-06-28 RX ADMIN — KETOROLAC TROMETHAMINE 15 MG: 15 INJECTION, SOLUTION INTRAMUSCULAR; INTRAVENOUS at 22:54

## 2017-06-28 ASSESSMENT — ENCOUNTER SYMPTOMS
DYSURIA: 0
VOMITING: 0
HEMATURIA: 0
SHORTNESS OF BREATH: 0
FREQUENCY: 0
DIARRHEA: 0

## 2017-06-28 NOTE — ED AVS SNAPSHOT
[facility]    [address]    [facility] [address]    Phone:  [phone]    Fax:  [phone]                                       Yuan Hensley   MRN: [id number]    Department:  [facility]   Date of Visit:  6/28/2017           Patient Information     Date Of Birth          1985        Your diagnoses for this visit were:     Other chest pain        You were seen by Ruslan Martin MD.      Follow-up Information     Follow up with Deacon Benson PA-C. Schedule an appointment as soon as possible for a visit in 1 day.    Specialty:  Physician Assistant - Medical    Why:  As needed, If symptoms worsen    Contact information:    [facility]  [address]  [facility] [address]  [phone]          Discharge Instructions          *CHEST PAIN, UNCERTAIN CAUSE    Based on your exam today, the exact cause of your chest pain is not certain. Your condition does not seem serious at this time, and your pain does not appear to be coming from your heart. However, sometimes the signs of a serious problem take more time to appear. Therefore, watch for the warning signs listed below.  HOME CARE:  1. Rest today and avoid strenuous activity.  2. Take any prescribed medicine as directed.  FOLLOW UP with your doctor in 1-3 days.   GET PROMPT MEDICAL ATTENTION if any of the following occur:    A change in the type of pain: if it feels different, becomes more severe, lasts longer, or begins to spread into your shoulder, arm, neck, jaw or back    Shortness of breath or increased pain with breathing    Weakness, dizziness, or fainting    Cough with blood or dark colored sputum (phlegm)    Fever over 101  F (38.3  C)    Swelling, pain or redness in one leg    [id number] [facility], [address] [place] [address], [place] [address]. All rights reserved. This information is not intended as a substitute for professional  medical care. Always follow your healthcare professional's instructions.      24 Hour Appointment Hotline       To make an appointment at any [facility] [place], call [phone] ([phone]). If you don't have a family doctor or clinic, we will help you find one. [facility] are conveniently located to serve the needs of you and your family.             Review of your medicines      Our records show that you are taking the medicines listed below. If these are incorrect, please call your family doctor or clinic.        Dose / Directions Last dose taken    albuterol 108 (90 BASE) MCG/ACT Inhaler   Commonly known as:  albuterol   Dose:  2 puff   Quantity:  1 Inhaler        Inhale 2 puffs into the lungs every 6 hours as needed for shortness of breath / dyspnea   Refills:  1        busPIRone 5 MG tablet   Commonly known as:  BUSPAR   Quantity:  150 tablet        Start at 5 mg twice daily for 3 days, then 7.5 mg (1.5 tabs) twice daily for 3 days, then 10 mg (2 tabs) twice daily for 3 days, then 12.5 mg (2.5 tabs) twice daily for 3 days, then 15 mg (3 tabs) twice daily and stay at that dose   Refills:  0        DULoxetine 60 MG EC capsule   Commonly known as:  CYMBALTA   Dose:  60 mg   Quantity:  90 capsule        Take 1 capsule (60 mg) by mouth daily   Refills:  1        fluticasone 50 MCG/ACT spray   Commonly known as:  FLONASE   Dose:  1-2 spray   Quantity:  16 g        Spray 1-2 sprays into both nostrils daily   Refills:  0        gabapentin 100 MG capsule   Commonly known as:  NEURONTIN   Quantity:  60 capsule        Take 1 capsule by mouth about 30 minutes prior to typical onset of restlessness. May increase by 1 capsule as needed every 3 days to max of 3.   Refills:  3        guaiFENesin-codeine 100-10 MG/5ML Soln solution   Commonly known as:  ROBITUSSIN AC   Quantity:  120 mL        1-2 tsp PO qhs prn cough   Refills:  0        LORazepam 0.5 MG tablet   Commonly known as:  ATIVAN   Dose:  0.5 mg    Quantity:  30 tablet        Take 1 tablet (0.5 mg) by mouth every 6 hours as needed for anxiety   Refills:  0        losartan 100 MG tablet   Commonly known as:  COZAAR   Dose:  100 mg   Quantity:  90 tablet        Take 1 tablet (100 mg) by mouth daily   Refills:  1        metFORMIN 500 MG 24 hr tablet   Commonly known as:  GLUCOPHAGE-XR   Dose:  1000 mg   Quantity:  120 tablet        Take 2 tablets (1,000 mg) by mouth 2 times daily (with meals)   Refills:  3        nebivolol 5 MG tablet   Commonly known as:  BYSTOLIC   Dose:  5 mg   Quantity:  30 tablet        Take 1 tablet (5 mg) by mouth daily   Refills:  3        omeprazole 40 MG capsule   Commonly known as:  priLOSEC   Dose:  40 mg   Quantity:  60 capsule        Take 1 capsule (40 mg) by mouth 2 times daily Take 30-60 minutes before a meal.   Refills:  10        order for DME   Quantity:  1 each        Equipment being ordered: right wrist splint   Refills:  0        phentermine 37.5 MG tablet   Commonly known as:  ADIPEX-P   Dose:  37.5 mg   Quantity:  31 tablet        Take 1 tablet (37.5 mg) by mouth every morning (before breakfast)   Refills:  2        traZODone 50 MG tablet   Commonly known as:  DESYREL   Dose:  150 mg   Quantity:  90 tablet        Take 3 tablets (150 mg) by mouth At Bedtime   Refills:  1        vitamin D 2000 UNITS tablet   Dose:  2000 Units   Quantity:  90 tablet        Take 2,000 Units by mouth daily   Refills:  1                Procedures and tests performed during your visit     EKG 12 lead    Troponin POCT    XR Chest 2 Views      Orders Needing Specimen Collection     None      Pending Results     Date and Time Order Name Status Description    6/28/2017 2214 EKG 12 lead Preliminary             Pending Culture Results     No orders found for last 3 day(s).            Pending Results Instructions     If you had any lab results that were not finalized at the time of your Discharge, you can call the ED Lab Result RN at [phone]. You  will be contacted by this team for any positive Lab results or changes in treatment. The nurses are available 7 days a week from 10A to 6:30P.  You can leave a message 24 hours per day and they will return your call.        Test Results From Your Hospital Stay        6/28/2017 10:31 PM      Component Results     Component Value Ref Range & Units Status    Troponin I 0.00 0.00 - 0.10 ug/L Final         6/29/2017 12:14 AM      Narrative     XR CHEST 2 VW  6/28/2017 11:04 PM      HISTORY: Chest pain.    COMPARISON: 12/7/2016.    FINDINGS: The heart size is normal. The lungs are clear. No  pneumothorax or pleural effusion.        Impression     IMPRESSION: No acute abnormality.    FAZAL LEHMAN MD                Clinical Quality Measure: Blood Pressure Screening     Your blood pressure was checked while you were in the emergency department today. The last reading we obtained was  BP: 121/81 . Please read the guidelines below about what these numbers mean and what you should do about them.  If your systolic blood pressure (the top number) is less than 120 and your diastolic blood pressure (the bottom number) is less than 80, then your blood pressure is normal. There is nothing more that you need to do about it.  If your systolic blood pressure (the top number) is 120-139 or your diastolic blood pressure (the bottom number) is 80-89, your blood pressure may be higher than it should be. You should have your blood pressure rechecked within a year by a primary care provider.  If your systolic blood pressure (the top number) is 140 or greater or your diastolic blood pressure (the bottom number) is 90 or greater, you may have high blood pressure. High blood pressure is treatable, but if left untreated over time it can put you at risk for heart attack, stroke, or kidney failure. You should have your blood pressure rechecked by a primary care provider within the next 4 weeks.  If your provider in the emergency department today  gave you specific instructions to follow-up with your doctor or provider even sooner than that, you should follow that instruction and not wait for up to 4 weeks for your follow-up visit.        Thank you for choosing [facility]       Thank you for choosing [facility] for your care. Our goal is always to provide you with excellent care. Hearing back from our patients is one way we can continue to improve our services. Please take a few minutes to complete the written survey that you may receive in the mail after you visit with us. Thank you!        [employer] Information     [employer] gives you secure access to your electronic health record. If you see a primary care provider, you can also send messages to your care team and make appointments. If you have questions, please call your primary care clinic.  If you do not have a primary care provider, please call [phone] and they will assist you.        Care EveryWhere ID     This is your Care EveryWhere ID. This could be used by other organizations to access your [facility] medical records  [id number]        Equal Access to Services     SHANNON VELAZQUEZ AH: Bonnie Sheffield, washantelda marie, qaestiventa kaalmacarena lewis, aristides padron . So [facility] [phone].    ATENCIÓN: Si habla español, tiene a patel disposición servicios gratuitos de asistencia lingüística. Llame al [phone].    We comply with applicable federal civil rights laws and [place] laws. We do not discriminate on the basis of race, color, national origin, age, disability sex, sexual orientation or gender identity.            After Visit Summary       This is your record. Keep this with you and show to your community pharmacist(s) and doctor(s) at your next visit.

## 2017-06-28 NOTE — ED AVS SNAPSHOT
[facility] Emergency Department    [address]    [facility] [address]    Phone:  [phone]    Fax:  [phone]                                       Yuan Hensley   MRN: [id number]    Department:  [facility] Emergency Department   Date of Visit:  6/28/2017           After Visit Summary Signature Page     I have received my discharge instructions, and my questions have been answered. I have discussed any challenges I see with this plan with the nurse or doctor.    ..........................................................................................................................................  Patient/Patient Representative Signature      ..........................................................................................................................................  Patient Representative Print Name and Relationship to Patient    ..................................................               ................................................  Date                                            Time    ..........................................................................................................................................  Reviewed by Signature/Title    ...................................................              ..............................................  Date                                                            Time

## 2017-06-29 VITALS
HEIGHT: 71 IN | DIASTOLIC BLOOD PRESSURE: 74 MMHG | SYSTOLIC BLOOD PRESSURE: 119 MMHG | RESPIRATION RATE: 18 BRPM | TEMPERATURE: 98.4 F | BODY MASS INDEX: 38.5 KG/M2 | OXYGEN SATURATION: 94 % | WEIGHT: 275 LBS

## 2017-06-29 LAB — INTERPRETATION ECG - MUSE: NORMAL

## 2017-06-29 NOTE — ED PROVIDER NOTES
History     Chief Complaint:  Chest Pain      MAXIMUS Hensley is a 32 year old male with a history of hypertension who presents to the emergency department today for evaluation of chest pain. The patient complains of left sided chest pain that began last night while he was eating dinner. Pain worsened today which is what prompted him to visit the ED. Here, patient reports pain radiates to his left elbow. He has pain when he pushes his chest. Patient took Tylenol and Ibuprofen last night with no improvement in pain.  No leg swelling. No vomiting. No shortness of breath. No change in urinary or bowel habits. Of note, patient has history of chest pain however workup returned normal.      Allergies:   Medications:    guaiFENesin-codeine (ROBITUSSIN AC) 100-10 MG/5ML SOLN solution  losartan (COZAAR) 100 MG tablet  albuterol (ALBUTEROL) 108 (90 BASE) MCG/ACT Inhaler  DULoxetine (CYMBALTA) 60 MG EC capsule  Cholecalciferol (VITAMIN D) 2000 UNITS tablet  traZODone (DESYREL) 50 MG tablet  busPIRone (BUSPAR) 5 MG tablet  LORazepam (ATIVAN) 0.5 MG tablet  phentermine (ADIPEX-P) 37.5 MG tablet  metFORMIN (GLUCOPHAGE-XR) 500 MG 24 hr tablet  omeprazole (PRILOSEC) 40 MG capsule  fluticasone (FLONASE) 50 MCG/ACT nasal spray  gabapentin (NEURONTIN) 100 MG capsule  nebivolol (BYSTOLIC) 5 MG tablet     Past Medical History:    Depressive disorder   Fibromyalgia   Gastro-oesophageal reflux disease   Hypertension     Past Surgical History:    ESOPHAGOSCOPY, GASTROSCOPY, DUODENOSCOPY (EGD), COMBINED   HEAD & NECK SURGERY-1 wisdom tooth removed  ORTHOPEDIC SURGERY-left shoulder tendon tear scope done  TONSILLECTOMY     Family History:    Mother: Diabetes, kidney stone, acid reflux  Father: Kidney cancer, diabetes  Maternal Grandmother: Diabetes, colorectal cancer  Maternal Grandfather: Diabetes, dementia, colon cancer   Paternal Grandmother: Diabetes     Social History:  The patient was accompanied to the ED by  "parents.  Smoking Status: Former smoker  Smokeless Tobacco: Never used  Alcohol Use: No  Marital Status:  Single [1]       Review of Systems   Respiratory: Negative for shortness of breath.    Cardiovascular: Positive for chest pain. Negative for leg swelling.   Gastrointestinal: Negative for diarrhea and vomiting.   Genitourinary: Negative for dysuria, frequency, hematuria and urgency.   All other systems reviewed and are negative.    Physical Exam   Vitals:  Patient Vitals for the past 24 hrs:   BP Temp Temp src Heart Rate Resp SpO2 Height Weight   06/28/17 2159 (!) 169/112 98.4  F (36.9  C) Temporal 84 18 99 % 1.803 m (5' 11\") 124.7 kg (275 lb)     Physical Exam  Constitutional: Patient is well appearing. No distress.  Head: Atraumatic.  Mouth/Throat: Oropharynx is clear and moist. No oropharyngeal exudate.  Eyes: Conjunctivae and EOM are normal. No scleral icterus.  Neck: Normal range of motion. Neck supple.   Cardiovascular: Normal rate, regular rhythm, normal heart sounds and intact distal pulses.   Pulmonary/Chest: Breath sounds normal. No respiratory distress.  Abdominal: Soft. Bowel sounds are normal. No distension. No tenderness. No rebound or guarding.   Musculoskeletal: Normal range of motion. No edema. Tenderness to palpation in left upper chest.  Neurological: Alert and orientated to person, place, and time. No observable focal neuro deficit  Skin: Warm and dry. No rash noted. Not diaphoretic.     Emergency Department Course     ECG:  ECG taken at 2208, ECG read at 2240  Normal sinus rhythm   Low voltage QRS  Nonspecific ST abnormality   Abnormal ECG  Rate 87 bpm. NM interval 170. QRS duration 84. QT/QTc 332/399. P-R-T axes 46 57 53.    Imaging:  Radiology findings were communicated with the patient who voiced understanding of the findings.    Chest XR, 2 Views:  No acute abnormality.  Reading per radiology    Laboratory:  Laboratory findings were communicated with the patient who voiced understanding " of the findings.    Troponin (Collected 2216): 0.00    Interventions:  2254- Toradol 15 mg IV     Emergency Department Course:  Nursing notes and vitals reviewed.  I performed an exam of the patient as documented above.     IV was inserted and blood was drawn for laboratory testing, results above.    The patient was sent for a XR while in the emergency department, results above.     I discussed the treatment plan with the patient. They expressed understanding of this plan and consented to discharge.    I personally reviewed the laboratory results with the Patient and answered all related questions prior to discharge.    Impression & Plan      Medical Decision Making:  PERC neg.  >24 hour sx and trop and ekg normal unlikely ACS.  CXR as above.  Reproducible to palpation and atypical.  Home with strict return and f/u.        Diagnosis:    ICD-10-CM    1. Other chest pain R07.89          Disposition:   The patient was discharged.    Scribe Disclosure:  Dyana ALEMAN, am serving as a scribe at 10:44 PM on 6/28/2017 to document services personally performed by Ruslan Martin MD, based on my observations and the provider's statements to me.    6/28/2017   [facility] EMERGENCY DEPARTMENT       Ruslan Martin MD  06/29/17 0515

## 2017-06-29 NOTE — DISCHARGE INSTRUCTIONS
*CHEST PAIN, UNCERTAIN CAUSE    Based on your exam today, the exact cause of your chest pain is not certain. Your condition does not seem serious at this time, and your pain does not appear to be coming from your heart. However, sometimes the signs of a serious problem take more time to appear. Therefore, watch for the warning signs listed below.  HOME CARE:  1. Rest today and avoid strenuous activity.  2. Take any prescribed medicine as directed.  FOLLOW UP with your doctor in 1-3 days.   GET PROMPT MEDICAL ATTENTION if any of the following occur:    A change in the type of pain: if it feels different, becomes more severe, lasts longer, or begins to spread into your shoulder, arm, neck, jaw or back    Shortness of breath or increased pain with breathing    Weakness, dizziness, or fainting    Cough with blood or dark colored sputum (phlegm)    Fever over 101  F (38.3  C)    Swelling, pain or redness in one leg    1740-4786 [address], [place] [address]. All rights reserved. This information is not intended as a substitute for professional medical care. Always follow your healthcare professional's instructions.

## 2017-06-29 NOTE — ED NOTES
"A&Ox4. ABC's intact. Pt c/o chest pain that radiates to back and down left arm to the elbow.  Pain started yesterday while eating.  Pain 8/10 at this time.  Pain ranges from \"achy to sharp\".  Took tylenol with no relief.  Laying down makes the pain worse.   "

## 2017-07-19 ENCOUNTER — TELEPHONE (OUTPATIENT)
Dept: FAMILY MEDICINE | Facility: CLINIC | Age: 32
End: 2017-07-19

## 2017-07-19 NOTE — LETTER
[address], [address]  [facility] [address]  [phone]  August 8, 2017    Yuan Hensley  [address]  [facility] [address]      Dear Yuan,    I care about your health and have reviewed your health plan.  I have reviewed your medical conditions, medication list, and lab results and am making recommendations  based on this review, to better manage your health.    You are particularly in need of attention regarding:  -Depression    I am recommending that you:  -schedule a FOLLOWUP OFFICE APPOINTMENT with me.          Here is a list of Health Maintenance topics that are due now or due soon:  Health Maintenance Due   Topic Date Due     DEPRESSION ACTION PLAN Q1 YR  08/04/2017     PHQ-9 Q6 MONTHS  08/09/2017       Please call us at [phone] (or use [employer]) to address the above   recommendations.    Thank you for trusting [facility] and we appreciate the opportunity to serve you.  We look forward to supporting your healthcare needs in the future.    Healthy Regards,    Deacon PETERSEN

## 2017-07-19 NOTE — TELEPHONE ENCOUNTER
Panel Management Review      Patient has the following on his problem list:     Depression / Dysthymia review  PHQ-9 SCORE 1/21/2016 8/2/2016 2/9/2017   Total Score - - -   Total Score 21 21 20      Patient is due for:  PHQ9 and DAP    Hypertension   Last three blood pressure readings:  BP Readings from Last 3 Encounters:   06/29/17 119/74   04/25/17 (!) 126/98   04/17/17 130/90     Blood pressure: Passed    HTN Guidelines:  Age 18-59 BP range:  Less than 140/90  Age 60-85 with Diabetes:  Less than 140/90  Age 60-85 without Diabetes:  less than 150/90          Composite cancer screening  Chart review shows that this patient is due/due soon for the following None  Summary:    Patient is due/failing the following:   DAP, FOLLOW UP and PHQ9    Action needed:   Patient needs office visit for Depression follow up, DAP. and Patient needs to do PHQ9.    Type of outreach:    Sent Spotlight message.    Questions for provider review:    None                                                                                                                                    Emily Guerrero MA       Chart routed to Care Team .

## 2017-07-20 DIAGNOSIS — F32.1 MAJOR DEPRESSIVE DISORDER, SINGLE EPISODE, MODERATE (H): ICD-10-CM

## 2017-07-20 NOTE — TELEPHONE ENCOUNTER
Cholecalciferol (VITAMIN D) 2000 UNITS tablet    Last Written Prescription Date: 2/9/17  Last Fill Quantity: 90, # refills: 1  Last Office Visit with G, P or [facility] prescribing provider: 4/17/2017         DEXA Scan:  No order of DX HIP/PELVIS/SPINE is found.     No order of DX HIP/PELVIS/SPINE W LAT FRACTION ANALYSIS is found.       Creatinine   Date Value Ref Range Status   12/06/2016 0.87 0.66 - 1.25 mg/dL Final       Tamara Shukla, XRT  July 20, 2017  8:55 AM

## 2017-07-21 RX ORDER — CHOLECALCIFEROL (VITAMIN D3) 50 MCG
2000 TABLET ORAL DAILY
Qty: 90 TABLET | Refills: 1 | Status: SHIPPED | OUTPATIENT
Start: 2017-07-21 | End: 2019-04-03

## 2017-07-21 NOTE — TELEPHONE ENCOUNTER
Prescription approved per [facility] Refill Protocol  Aditi Velasquez RN BS

## 2017-08-01 DIAGNOSIS — F41.9 ANXIETY: ICD-10-CM

## 2017-08-01 NOTE — TELEPHONE ENCOUNTER
Routing refill request to provider for review/approval because:  Labs out of range:  PHQ  Appears staff have been trying to reach patient all July with no response  Chen Polk RN, BSN

## 2017-08-01 NOTE — TELEPHONE ENCOUNTER
DULoxetine (CYMBALTA) 60 MG EC capsule    Last Written Prescription Date: 2/9/17  Last Fill Quantity: 90, # refills: 1  Last Office Visit with [facility], [facility] or [facility] prescribing provider: 4/17/2017          BP Readings from Last 3 Encounters:   06/29/17 119/74   04/25/17 (!) 126/98   04/17/17 130/90     Pulse: (for Fetzima)  Creatinine   Date Value Ref Range Status   12/06/2016 0.87 0.66 - 1.25 mg/dL Final       Last PHQ-9 score on record=   PHQ-9 SCORE 2/9/2017   Total Score -   Total Score 20     JAGDISH Alberto  August 1, 2017  1:52 PM

## 2017-08-04 RX ORDER — DULOXETIN HYDROCHLORIDE 60 MG/1
60 CAPSULE, DELAYED RELEASE ORAL DAILY
Qty: 30 CAPSULE | Refills: 0 | Status: SHIPPED | OUTPATIENT
Start: 2017-08-04 | End: 2019-04-03

## 2017-10-10 ENCOUNTER — TELEPHONE (OUTPATIENT)
Dept: FAMILY MEDICINE | Facility: CLINIC | Age: 32
End: 2017-10-10

## 2017-10-10 NOTE — TELEPHONE ENCOUNTER
Panel Management Review      Patient has the following on his problem list:     Depression / Dysthymia review    Measure:  Needs PHQ-9 score of 4 or less during index window.  Administer PHQ-9 and if score is 5 or more, send encounter to provider for next steps.    5 - 7 month window range:     PHQ-9 SCORE 1/21/2016 8/2/2016 2/9/2017   Total Score - - -   Total Score 21 21 20       If PHQ-9 recheck is 5 or more, route to provider for next steps.    Patient is due for:  PHQ9 and DAP    Hypertension   Last three blood pressure readings:  BP Readings from Last 3 Encounters:   06/29/17 119/74   04/25/17 (!) 126/98   04/17/17 130/90     Blood pressure: Passed    HTN Guidelines:  Age 18-59 BP range:  Less than 140/90  Age 60-85 with Diabetes:  Less than 140/90  Age 60-85 without Diabetes:  less than 150/90          Composite cancer screening  Chart review shows that this patient is due/due soon for the following None  Summary:    Patient is due/failing the following:   DAP, FOLLOW UP and PHQ9    Action needed:   Patient needs office visit for depression follow up, DAP. and Patient needs to do PHQ9.    Type of outreach:    Sent [employer] message.    Questions for provider review:    None                                                                                                                                    Emily Guerrero MA       Chart routed to Care Team .

## 2017-10-10 NOTE — LETTER
[address], [address]  [facility] [id number] [phone]  October 25, 2017    Yuan Hensley  [address]  [facility] [address]      Dear Yuan,    I care about your health and have reviewed your health plan.  I have reviewed your medical conditions, medication list, and lab results and am making recommendations  based on this review, to better manage your health.    You are particularly in need of attention regarding:  -Depression    I am recommending that you:  -schedule a FOLLOWUP OFFICE APPOINTMENT with me.        Here is a list of Health Maintenance topics that are due now or due soon:  Health Maintenance Due   Topic Date Due     DEPRESSION ACTION PLAN Q1 YR  08/04/2017     PHQ-9 Q6 MONTHS  08/09/2017     INFLUENZA VACCINE (SYSTEM ASSIGNED)  09/01/2017       Please call us at [phone] (or use [employer]) to address the above   recommendations.    Thank you for trusting [facility] and we appreciate the opportunity to serve you.  We look forward to supporting your healthcare needs in the future.    Healthy Regards,    Deacon PETERSEN

## 2017-10-18 NOTE — TELEPHONE ENCOUNTER
"2nd attempt  Phone, spoke with mom, she stated \" he is working right now, so it is hard to get a hold of him right now\".  Left message to please have him call clinic back when he has a chance.    Emily Guerrero MA    "

## 2018-02-09 ENCOUNTER — TELEPHONE (OUTPATIENT)
Dept: FAMILY MEDICINE | Facility: CLINIC | Age: 33
End: 2018-02-09

## 2018-02-09 NOTE — TELEPHONE ENCOUNTER
Panel Management Review      Patient has the following on his problem list:     Depression / Dysthymia review    Measure:  Needs PHQ-9 score of 4 or less during index window.  Administer PHQ-9 and if score is 5 or more, send encounter to provider for next steps.    5 - 7 month window range:     PHQ-9 SCORE 1/21/2016 8/2/2016 2/9/2017   Total Score - - -   Total Score 21 21 20       If PHQ-9 recheck is 5 or more, route to provider for next steps.    Patient is due for:  PHQ9 and DAP    Hypertension   Last three blood pressure readings:  BP Readings from Last 3 Encounters:   06/29/17 119/74   04/25/17 (!) 126/98   04/17/17 130/90     Blood pressure: Passed    HTN Guidelines:  Age 18-59 BP range:  Less than 140/90  Age 60-85 with Diabetes:  Less than 140/90  Age 60-85 without Diabetes:  less than 150/90      Composite cancer screening  Chart review shows that this patient is due/due soon for the following None  Summary:    Patient is due/failing the following:   BP CHECK, DAP, FOLLOW UP and PHQ9    Action needed:   Patient needs office visit for depression, hypertension follow up, DAP. and Patient needs to do PHQ9.    Type of outreach:    Sent [employer] message.    Questions for provider review:    None                                                                                                                                    Emily Guerrero MA       Chart routed to Care Team .

## 2018-02-09 NOTE — LETTER
[address], [address]  [facility] [id number] [phone]  February 16, 2018    Yuan Hensley  [address]  [facility] [address]      Dear Yuan,    I care about your health and have reviewed your health plan.  I have reviewed your medical conditions, medication list, and lab results and am making recommendations  based on this review, to better manage your health.    You are particularly in need of attention regarding:  -Depression and -High Blood Pressure    I am recommending that you:  -schedule a FOLLOWUP OFFICE APPOINTMENT with me.         Here is a list of Health Maintenance topics that are due now or due soon:  Health Maintenance Due   Topic Date Due     DEPRESSION ACTION PLAN Q1 YR  08/04/2017     PHQ-9 Q6 MONTHS  08/09/2017     INFLUENZA VACCINE (SYSTEM ASSIGNED)  09/01/2017       Please call us at [phone] (or use [employer]) to address the above   recommendations.    Thank you for trusting [facility] and we appreciate the opportunity to serve you.  We look forward to supporting your healthcare needs in the future.    Healthy Regards,    Deacon Benson PAC/krs

## 2018-02-16 NOTE — TELEPHONE ENCOUNTER
2nd attempt  Sent letter due to patient preference - has not checked My Chart message.    Emily Guerrero MA    Encounter closed.

## 2019-01-04 ENCOUNTER — APPOINTMENT (OUTPATIENT)
Dept: CT IMAGING | Facility: CLINIC | Age: 34
End: 2019-01-04
Payer: MEDICARE

## 2019-01-04 ENCOUNTER — HOSPITAL ENCOUNTER (EMERGENCY)
Facility: CLINIC | Age: 34
Discharge: HOME | End: 2019-01-04
Attending: EMERGENCY MEDICINE | Admitting: EMERGENCY MEDICINE
Payer: MEDICARE

## 2019-01-04 VITALS
TEMPERATURE: 98.4 F | DIASTOLIC BLOOD PRESSURE: 78 MMHG | SYSTOLIC BLOOD PRESSURE: 128 MMHG | HEIGHT: 72 IN | OXYGEN SATURATION: 97 % | WEIGHT: 270 LBS | HEART RATE: 71 BPM | BODY MASS INDEX: 36.57 KG/M2 | RESPIRATION RATE: 18 BRPM

## 2019-01-04 DIAGNOSIS — R10.84 ABDOMINAL PAIN, GENERALIZED: ICD-10-CM

## 2019-01-04 DIAGNOSIS — R30.0 DYSURIA: ICD-10-CM

## 2019-01-04 LAB
ALBUMIN SERPL-MCNC: 3.5 G/DL (ref 3.4–5)
ALBUMIN UR-MCNC: NEGATIVE MG/DL
ALP SERPL-CCNC: 103 U/L (ref 40–150)
ALT SERPL W P-5'-P-CCNC: 30 U/L (ref 0–70)
ANION GAP SERPL CALCULATED.3IONS-SCNC: 3 MMOL/L (ref 3–14)
APPEARANCE UR: CLEAR
AST SERPL W P-5'-P-CCNC: 16 U/L (ref 0–45)
BASOPHILS # BLD AUTO: 0.1 10E9/L (ref 0–0.2)
BASOPHILS NFR BLD AUTO: 0.5 %
BILIRUB SERPL-MCNC: 1.2 MG/DL (ref 0.2–1.3)
BILIRUB UR QL STRIP: NEGATIVE
BUN SERPL-MCNC: 8 MG/DL (ref 7–30)
CALCIUM SERPL-MCNC: 8.4 MG/DL (ref 8.5–10.1)
CHLORIDE SERPL-SCNC: 105 MMOL/L (ref 94–109)
CO2 SERPL-SCNC: 29 MMOL/L (ref 20–32)
COLOR UR AUTO: ABNORMAL
CREAT SERPL-MCNC: 0.79 MG/DL (ref 0.66–1.25)
DIFFERENTIAL METHOD BLD: ABNORMAL
EOSINOPHIL # BLD AUTO: 0.5 10E9/L (ref 0–0.7)
EOSINOPHIL NFR BLD AUTO: 3.9 %
ERYTHROCYTE [DISTWIDTH] IN BLOOD BY AUTOMATED COUNT: 12.1 % (ref 10–15)
GFR SERPL CREATININE-BSD FRML MDRD: >90 ML/MIN/{1.73_M2}
GLUCOSE SERPL-MCNC: 102 MG/DL (ref 70–99)
GLUCOSE UR STRIP-MCNC: NEGATIVE MG/DL
HCT VFR BLD AUTO: 44.2 % (ref 40–53)
HGB BLD-MCNC: 14.2 G/DL (ref 13.3–17.7)
HGB UR QL STRIP: NEGATIVE
IMM GRANULOCYTES # BLD: 0 10E9/L (ref 0–0.4)
IMM GRANULOCYTES NFR BLD: 0.3 %
KETONES UR STRIP-MCNC: NEGATIVE MG/DL
LACTATE BLD-SCNC: 1.6 MMOL/L (ref 0.7–2)
LEUKOCYTE ESTERASE UR QL STRIP: NEGATIVE
LIPASE SERPL-CCNC: 164 U/L (ref 73–393)
LYMPHOCYTES # BLD AUTO: 2.6 10E9/L (ref 0.8–5.3)
LYMPHOCYTES NFR BLD AUTO: 21.9 %
MCH RBC QN AUTO: 29.6 PG (ref 26.5–33)
MCHC RBC AUTO-ENTMCNC: 32.1 G/DL (ref 31.5–36.5)
MCV RBC AUTO: 92 FL (ref 78–100)
MONOCYTES # BLD AUTO: 0.9 10E9/L (ref 0–1.3)
MONOCYTES NFR BLD AUTO: 8.1 %
MUCOUS THREADS #/AREA URNS LPF: PRESENT /LPF
NEUTROPHILS # BLD AUTO: 7.6 10E9/L (ref 1.6–8.3)
NEUTROPHILS NFR BLD AUTO: 65.3 %
NITRATE UR QL: NEGATIVE
NRBC # BLD AUTO: 0 10*3/UL
NRBC BLD AUTO-RTO: 0 /100
PH UR STRIP: 6 PH (ref 5–7)
PLATELET # BLD AUTO: 305 10E9/L (ref 150–450)
POTASSIUM SERPL-SCNC: 4.2 MMOL/L (ref 3.4–5.3)
PROT SERPL-MCNC: 7.2 G/DL (ref 6.8–8.8)
RBC # BLD AUTO: 4.8 10E12/L (ref 4.4–5.9)
RBC #/AREA URNS AUTO: <1 /HPF (ref 0–2)
SODIUM SERPL-SCNC: 137 MMOL/L (ref 133–144)
SOURCE: ABNORMAL
SP GR UR STRIP: 1.01 (ref 1–1.03)
UROBILINOGEN UR STRIP-MCNC: 0 MG/DL (ref 0–2)
WBC # BLD AUTO: 11.7 10E9/L (ref 4–11)
WBC #/AREA URNS AUTO: 0 /HPF (ref 0–5)

## 2019-01-04 PROCEDURE — 96375 TX/PRO/DX INJ NEW DRUG ADDON: CPT

## 2019-01-04 PROCEDURE — 25000128 H RX IP 250 OP 636: Performed by: EMERGENCY MEDICINE

## 2019-01-04 PROCEDURE — 83605 ASSAY OF LACTIC ACID: CPT | Performed by: EMERGENCY MEDICINE

## 2019-01-04 PROCEDURE — 80053 COMPREHEN METABOLIC PANEL: CPT | Performed by: EMERGENCY MEDICINE

## 2019-01-04 PROCEDURE — 96374 THER/PROPH/DIAG INJ IV PUSH: CPT | Mod: 59

## 2019-01-04 PROCEDURE — 96361 HYDRATE IV INFUSION ADD-ON: CPT

## 2019-01-04 PROCEDURE — 81001 URINALYSIS AUTO W/SCOPE: CPT | Performed by: EMERGENCY MEDICINE

## 2019-01-04 PROCEDURE — C9113 INJ PANTOPRAZOLE SODIUM, VIA: HCPCS | Performed by: EMERGENCY MEDICINE

## 2019-01-04 PROCEDURE — 74177 CT ABD & PELVIS W/CONTRAST: CPT

## 2019-01-04 PROCEDURE — 85025 COMPLETE CBC W/AUTO DIFF WBC: CPT | Performed by: EMERGENCY MEDICINE

## 2019-01-04 PROCEDURE — 83690 ASSAY OF LIPASE: CPT | Performed by: EMERGENCY MEDICINE

## 2019-01-04 PROCEDURE — 99285 EMERGENCY DEPT VISIT HI MDM: CPT | Mod: 25

## 2019-01-04 RX ORDER — CIPROFLOXACIN 500 MG/1
500 TABLET, FILM COATED ORAL 2 TIMES DAILY
Qty: 14 TABLET | Refills: 0 | Status: SHIPPED | OUTPATIENT
Start: 2019-01-04 | End: 2019-04-03

## 2019-01-04 RX ORDER — HYDROCODONE BITARTRATE AND ACETAMINOPHEN 5; 325 MG/1; MG/1
1 TABLET ORAL EVERY 6 HOURS PRN
Qty: 18 TABLET | Refills: 0 | Status: SHIPPED | OUTPATIENT
Start: 2019-01-04 | End: 2019-04-03

## 2019-01-04 RX ORDER — SODIUM CHLORIDE 9 MG/ML
1000 INJECTION, SOLUTION INTRAVENOUS CONTINUOUS
Status: DISCONTINUED | OUTPATIENT
Start: 2019-01-04 | End: 2019-01-05 | Stop reason: HOSPADM

## 2019-01-04 RX ORDER — IOPAMIDOL 755 MG/ML
500 INJECTION, SOLUTION INTRAVASCULAR ONCE
Status: COMPLETED | OUTPATIENT
Start: 2019-01-04 | End: 2019-01-04

## 2019-01-04 RX ORDER — MORPHINE SULFATE 4 MG/ML
4 INJECTION, SOLUTION INTRAMUSCULAR; INTRAVENOUS
Status: DISCONTINUED | OUTPATIENT
Start: 2019-01-04 | End: 2019-01-05 | Stop reason: HOSPADM

## 2019-01-04 RX ORDER — ONDANSETRON 2 MG/ML
4 INJECTION INTRAMUSCULAR; INTRAVENOUS EVERY 30 MIN PRN
Status: DISCONTINUED | OUTPATIENT
Start: 2019-01-04 | End: 2019-01-05 | Stop reason: HOSPADM

## 2019-01-04 RX ADMIN — SODIUM CHLORIDE 1000 ML: 9 INJECTION, SOLUTION INTRAVENOUS at 20:02

## 2019-01-04 RX ADMIN — MORPHINE SULFATE 4 MG: 4 INJECTION INTRAVENOUS at 20:11

## 2019-01-04 RX ADMIN — SODIUM CHLORIDE 65 ML: 9 INJECTION, SOLUTION INTRAVENOUS at 20:56

## 2019-01-04 RX ADMIN — PANTOPRAZOLE SODIUM 40 MG: 40 INJECTION, POWDER, FOR SOLUTION INTRAVENOUS at 20:15

## 2019-01-04 RX ADMIN — IOPAMIDOL 100 ML: 755 INJECTION, SOLUTION INTRAVENOUS at 20:56

## 2019-01-04 RX ADMIN — ONDANSETRON 4 MG: 2 INJECTION INTRAMUSCULAR; INTRAVENOUS at 20:08

## 2019-01-04 ASSESSMENT — MIFFLIN-ST. JEOR: SCORE: 2202.71

## 2019-01-04 ASSESSMENT — ENCOUNTER SYMPTOMS
DIARRHEA: 0
FEVER: 0
BLOOD IN STOOL: 0
ABDOMINAL PAIN: 1

## 2019-01-04 NOTE — ED TRIAGE NOTES
Pt arrives with pelvic pain starting wed and L leg pain/ low back pain and dysuria/ frequency starting today. Was seen at [facility] on wed, states he thinks he got profolacticly treated for STDs. ABCs intact.

## 2019-01-04 NOTE — ED AVS SNAPSHOT
[facility]  [address]  [facility] [address]  Phone:  [phone]  Fax:  [phone]                                    Yuan Hensley   MRN: [id number]    Department:  [facility]   Date of Visit:  1/4/2019           After Visit Summary Signature Page    I have received my discharge instructions, and my questions have been answered. I have discussed any challenges I see with this plan with the nurse or doctor.    ..........................................................................................................................................  Patient/Patient Representative Signature      ..........................................................................................................................................  Patient Representative Print Name and Relationship to Patient    ..................................................               ................................................  Date                                   Time    ..........................................................................................................................................  Reviewed by Signature/Title    ...................................................              ..............................................  Date                                               Time          22EPIC Rev 08/18

## 2019-01-05 NOTE — DISCHARGE INSTRUCTIONS
Opioid Medication Information    You have been given a prescription for an opioid (narcotic) pain medicine and/or have received a pain medicine while here in the Emergency Department. These medicines can make you drowsy or impaired. You must not drive, operate dangerous equipment, or engage in any other dangerous activities while taking these medications. If you drive while taking these medications, you could be arrested for DUI, or driving under the influence. Do not drink any alcohol while you are taking these medications.   Opioid pain medications can cause addiction. If you have a history of chemical dependency of any type, you are at a higher risk of becoming addicted to pain medications.  Only take these prescribed medications to treat your pain when all other options have been tried. Take it for as short a time and as few doses as possible. Store your pain pills in a secure place, as they are frequently stolen and provide a dangerous opportunity for children or visitors in your house to start abusing these powerful medications. We will not replace any lost or stolen medicine.  As soon as your pain is better, you should flush all your remaining medication.   Many prescription pain medications contain Tylenol  (acetaminophen), including Vicodin , Tylenol #3 , Norco , Lortab , and Percocet .  You should not take any extra pills of Tylenol  if you are using these prescription medications or you can get very sick.  Do not ever take more than 4000 mg of acetaminophen in any 24 hour period.  All opioids tend to cause constipation. Drink plenty of water and eat foods that have a lot of fiber, such as fruits, vegetables, prune juice, apple juice and high fiber cereal.  Take a laxative if you don?t move your bowels at least every other day. Miralax , Milk of Magnesia, Colace , or Senna  can be used to keep you regular.

## 2019-01-05 NOTE — ED PROVIDER NOTES
History     Chief Complaint:  Abdominal Pain    MAXIMUS Hensley is a 33 year old male who presents to the emergency department today with abdominal pain. Patient reports low abdominal pain since Tuesday. He was seen in Urgent care and had urine tests and STD testing, which has not yet returned. Treated with rocephin and erythromycin.  Patient presents today due to continued and worsening lower abdominal pain and pressure. He was told to come in to the ER if the pain gets worse. He reports dysuria, pain radiating to his back and left leg that started today, vomiting yesterday. He denies fever, diarrhea, blood in stool. He denies recent travel.     Allergies:  Ceclor     Medications:    Albuterol (Albuterol) 108 (90 Base) Mcg/act Inhaler  Buspirone (Buspar) 5 Mg Tablet  Cholecalciferol (Vitamin D) 2000 Units Tablet  Duloxetine (Cymbalta) 60 Mg Ec Capsule  Fluticasone (Flonase) 50 Mcg/act Nasal Spray  Gabapentin (Neurontin) 100 Mg Capsule  Guaifenesin-codeine (Robitussin Ac) 100-10 Mg/5ml Soln Solution  Lorazepam (Ativan) 0.5 Mg Tablet  Losartan (Cozaar) 100 Mg Tablet  Metformin (Glucophage-xr) 500 Mg 24 Hr Tablet  Nebivolol (Bystolic) 5 Mg Tablet  Omeprazole (Prilosec) 40 Mg Capsule  Phentermine (Adipex-p) 37.5 Mg Tablet  Trazodone (Desyrel) 50 Mg Tablet    Past Medical History:    Prediabetes  Essential hypertension, benign  Elevated glucose  Low vitamin D level  Morbid obesity due to excess calories (H)  Plantar fasciitis  Tendonitis, Achilles, right  Painful respiration  Headache  Adhesive capsulitis of shoulder  Pain in joint, shoulder region  Anxiety  Eosinophilic esophagitis  Obesity  Knee pain  Fibromyalgia muscle pain  Hypertension  Patellar pain  HTN (hypertension)  LULA positive  Physiologic anisocoria  Cervicalgia  Lumbago  GERD (gastroesophageal reflux disease)  Costochondritis  Moderate major depression (H)  Tremor  Learning disabilities    Past Surgical History:    EGD  Head and Neck surgery    Tonsillectomy  Orthopedic surgery     Family History:    Diabetes  Kidney disease  Gastrointestinal disease  Cancer  Parkinsonism    Social History:  The patient was alone.  Smoking Status: Former  Smokeless Tobacco: Never  Alcohol Use: Yes   Marital Status:  Single     Review of Systems   Constitutional: Negative for fever.   Gastrointestinal: Positive for abdominal pain (radiating to left leg and back). Negative for blood in stool and diarrhea.   All other systems reviewed and are negative.        Physical Exam     Patient Vitals for the past 24 hrs:   BP Temp Temp src Pulse Heart Rate Resp SpO2 Height Weight   01/04/19 2215 128/78 -- -- 71 -- -- 97 % -- --   01/04/19 2105 -- -- -- -- -- -- 97 % -- --   01/04/19 2100 137/75 -- -- 77 -- -- -- -- --   01/04/19 2030 -- -- -- -- -- -- 98 % -- --   01/04/19 2005 135/81 -- -- 68 -- -- 97 % -- --   01/04/19 1649 (!) 172/114 98.4  F (36.9  C) Oral -- 81 18 93 % 1.829 m (6') 122.5 kg (270 lb)        Physical Exam   Constitutional: He is oriented to person, place, and time. He appears well-developed and well-nourished.   HENT:   Right Ear: External ear normal.   Left Ear: External ear normal.   Mouth/Throat: Oropharynx is clear and moist. No oropharyngeal exudate.   TM's clear bilaterally   Eyes: Conjunctivae are normal. Pupils are equal, round, and reactive to light. No scleral icterus.   Neck: Normal range of motion. Neck supple.   Cardiovascular: Normal rate, regular rhythm, normal heart sounds and intact distal pulses. Exam reveals no gallop and no friction rub.   No murmur heard.  Pulmonary/Chest: Effort normal and breath sounds normal. No respiratory distress. He has no wheezes. He has no rales.   Abdominal: Soft. Bowel sounds are normal. He exhibits no distension and no mass. There is tenderness (Right lower quadrant and suprapubic tenderness ).   Genitourinary: No penile tenderness.   Genitourinary Comments: Bilaterally descended testicles, no scrotal swelling or  redness or pain. No penile discharge seen   Musculoskeletal: Normal range of motion. He exhibits no edema.   Neurological: He is alert and oriented to person, place, and time. No cranial nerve deficit.   Skin: Skin is warm and dry. No rash noted.   Psychiatric: He has a normal mood and affect.     Emergency Department Course   Imaging:  Radiology findings were communicated with the patient who voiced understanding of the findings.  CT Abdomen Pelvis w Contrast   Final Result   IMPRESSION: Unremarkable CT abdomen and pelvis.      ANTHONY COHEN MD      Report per radiology      Laboratory:  Laboratory findings were communicated with the patient who voiced understanding of the findings.  UA: clear, straw urine with mucous present o/w WNL   Lipase: 164   CBC: AWNL (WBC 11.7 , HGB 14.2, )  Lactic Acid: 1.6   CMP: 102 Glucose, 8.4 Ca o/w WNL (Creatinine 0.79)     Interventions:  2002: 0.9% NaCl 1L IV Bolus   2008: Zofran 4mg IV   2011: Morphine 4mg IV injection   2015: Protonix 40 mg IV   2056: 0.9% NaCl 65mL IV Bolus     Emergency Department Course:  Nursing notes and vitals reviewed.  1942: I performed an exam of the patient as documented above.   The patient provided a urine sample here in the emergency department. This was sent for laboratory testing, findings above.   IV was inserted and blood was drawn for laboratory testing, results above.   The patient was sent for a CT Abdomen Pelvis while in the emergency department, results above.   2219: Findings and plan explained to the Patient. Patient discharged home with instructions regarding supportive care, medications, and reasons to return. The importance of close follow-up was reviewed. The patient was prescribed Norco and Cipro.     I personally reviewed the laboratory and imaging results with the Patient and answered all related questions prior to discharge.   Impression & Plan    Medical Decision Making:  Yuan Hensley is a 33 year old male who  presents with lower abdominal pain, especially with urinary urgency, hesitancy, and pain. He was treated for STD at [place] but continues to have symptoms. He is afebrile. He does have diffuse lower abdominal pain, worse in the right lower quadrant, as well as suprapubic. There was no testicular swelling or pain to suggest torsion. There are no masses, tenderness on examination. His urine here is negative. His symptoms seem to be more right lower quadrant. There are no signs of appendicitis on CT. CT of abdomen was unremarkable. He did well here and passed the PO challenge. Since he is having a lot of urinary symptoms, I will go ahead and put him on Cipro for now, while we wait for the urine culture from [place]. We did discuss to make sure he is drinking lots of fluids and using Motrin and Tylenol as needed. I did send him home with prescription of Vicodin for the severe pain. He is comfortable with the plan of watching this for the weekend, taking Cipro. We did discuss taking probiotic as well. He is comfortable with the plan. It is uncertain at this point what the diagnosis is. He needs to follow up on Monday with his doctor and further evaluate this. Certainly if things worsen, he needs to return to the ER immediately.     Diagnosis:    ICD-10-CM    1. Dysuria R30.0    2. Abdominal pain, generalized R10.84        Disposition:  discharged to home    Discharge Medications:     Medication List      Started    ciprofloxacin 500 MG tablet  Commonly known as:  CIPRO  500 mg, Oral, 2 TIMES DAILY     HYDROcodone-acetaminophen 5-325 MG tablet  Commonly known as:  NORCO  1 tablet, Oral, EVERY 6 HOURS PRN            Scribe Disclosure:  Francisca ALEMAN, am serving as a scribe at 7:51 PM on 1/4/2019 to document services personally performed by Foster Woodard MD based on my observations and the provider's statements to me.    1/4/2019   [facility] EMERGENCY DEPARTMENT       Foster Woodard,  MD  01/05/19 0133

## 2019-04-03 ENCOUNTER — HOSPITAL ENCOUNTER (EMERGENCY)
Facility: CLINIC | Age: 34
Discharge: HOME | End: 2019-04-03
Attending: EMERGENCY MEDICINE | Admitting: EMERGENCY MEDICINE
Payer: MEDICARE

## 2019-04-03 VITALS
DIASTOLIC BLOOD PRESSURE: 89 MMHG | TEMPERATURE: 98.2 F | SYSTOLIC BLOOD PRESSURE: 126 MMHG | OXYGEN SATURATION: 98 % | RESPIRATION RATE: 16 BRPM | HEART RATE: 83 BPM

## 2019-04-03 DIAGNOSIS — T18.128A FOOD IMPACTION OF ESOPHAGUS, INITIAL ENCOUNTER: ICD-10-CM
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
## 2019-04-03 PROCEDURE — 99282 EMERGENCY DEPT VISIT SF MDM: CPT

## 2019-04-03 ASSESSMENT — ENCOUNTER SYMPTOMS
CHOKING: 1
VOMITING: 1
TROUBLE SWALLOWING: 1

## 2019-04-03 NOTE — ED NOTES
Half packet of EZ gas and 30 mls soda mixed and administered to pt. No vomiting after administration; pt states that he feels corn dog passed.

## 2019-04-03 NOTE — ED NOTES
Bed: ED03  Expected date: 4/3/19  Expected time: 1:04 PM  Means of arrival: Ambulance  Comments:  BV -4 34 yo M

## 2019-04-03 NOTE — ED AVS SNAPSHOT
[facility] Emergency Department  [address]  [facility] [address]  Phone:  [phone]  Fax:  [phone]                                    Yuan Hensley   MRN: [id number]    Department:  [facility] Emergency Department   Date of Visit:  4/3/2019           After Visit Summary Signature Page    I have received my discharge instructions, and my questions have been answered. I have discussed any challenges I see with this plan with the nurse or doctor.    ..........................................................................................................................................  Patient/Patient Representative Signature      ..........................................................................................................................................  Patient Representative Print Name and Relationship to Patient    ..................................................               ................................................  Date                                   Time    ..........................................................................................................................................  Reviewed by Signature/Title    ...................................................              ..............................................  Date                                               Time          22EPIC Rev 08/18

## 2019-04-03 NOTE — ED TRIAGE NOTES
Pt was eating corn dog and believes that he has a piece lodged in his esophagus. ABC intact. A/O x4.

## 2019-04-03 NOTE — DISCHARGE INSTRUCTIONS
Please follow-up with your gastroenterologist Dr. Goldstein.  Please give his office a call tomorrow.  Please be careful when eating meat in which you will need to cut it up into fine pieces and chew it well.  Please also be cautious of bread as this can also lead to obstruction.

## 2019-04-03 NOTE — ED PROVIDER NOTES
History     Chief Complaint:  Food Bolus     HPI   Yuan MYRA Hensley is a 33 year old male with a history of hypertension who presents to the emergency department today via EMS for evaluation of a food bolus obstruction. Per chart review, the patient had an upper GI endoscopy in 2015 which showed a hiatal hernia and several gastric ulcers. He had previously been diagnosed with eosinophilic esophagitis. Today, patient reports that at around 1230, he was eating a corn dog when he immediately felt it get stuck in his throat. He vomited part of the corn dog out, but has since been vomiting phlegm and unable to swallow his own saliva. The last time he had issues with this was in 2015, and he is only on Prilosec.     Allergies:  Ceclor      Medications:    Bystolic   Prilosec     Past Medical History:    Depression   Hypertension   Fibromyalgia   GERD   Eosinophilic esophagitis     Past Surgical History:    Gladys teeth extraction   Left shoulder tendon repair   Tonsillectomy     Family History:    Mother: diabetes, kidney stones, acid reflux   Father: kidney cancer, diabetes   Maternal grandmother: diabetes, colorectal cancer  Maternal grandfather: parkinson's, diabetes, dementia, colon cancer   Paternal grandmother: diabetes     Social History:  Smoking Status: Former 1.00 ppd cigarette smoker for 4 years, quit in 2013  Smokeless Tobacco: Never Used  Alcohol Use: Positive, rare  Drug Use: Negative    Marital Status: Single      Review of Systems   HENT: Positive for trouble swallowing.    Respiratory: Positive for choking (food bolus).    Gastrointestinal: Positive for vomiting.   All other systems reviewed and are negative.      Physical Exam     Patient Vitals for the past 24 hrs:   BP Temp Temp src Pulse Heart Rate Resp SpO2   04/03/19 1400 126/89 -- -- 83 -- 16 99 %   04/03/19 1312 -- 98.2  F (36.8  C) Oral -- -- -- --   04/03/19 1308 (!) 149/96 -- -- -- 105 16 95 %     Physical Exam    GEN:    Pleasant, age  appropriate male who appears uncomfortable.     Patient spitting saliva into an emesis basin.  HEENT:    Tympanic membranes are clear bilateral.      Oropharynx is moist.       No tonsillar erythema, exudate or asymmetric edema.     No deviation of the uvula.     No pooling of secretions, trismus or sublingual edema.  Eyes:    Conjunctiva normal, PERRL  Neck:    Supple, no meningismus.       No pain with manipulation of the hyoid.   CV:     Regular rate and rhythm     No murmurs, rubs or gallops.    PULM:    Clear to auscultation bilateral.       No respiratory distress.       No stridor.  ABD:    Soft, non-tender, non-distended.      No rebound or guarding.  MSK:     No gross deformity to all four extremities.   LYMPH:   No cervical lymphadenopathy.  NEURO:   Alert.  Normal muscular tone, no atrophy.  Skin:    Warm, dry and intact.    PSYCH:    Mildly anxious      Emergency Department Course     Emergency Department Course:    1310 Nursing notes and vitals reviewed.    1313 I performed an exam of the patient as documented above.     1346 Rechecked and updated. He is feeling better, has no discomfort, and is drinking water without difficulty.     1415 I personally answered all related questions prior to discharge.    Impression & Plan      Medical Decision Making:  Yuan Hensley is a 33 year old male who presents to the emergency department today for evaluation of food impaction.  Patient's clinical history and examination is consistent with esophageal food bolus.  Patient was provided EZ gas which completely resolved his symptoms.  Since that time he has been able to eat and drink without difficulty.  Patient will continue on omeprazole and will be referred back to his primary GI Dr. Goldstein    Diagnosis:    ICD-10-CM    1. Food impaction of esophagus, initial encounter T18.128A      Disposition:   The patient is discharged to home.    Discharge Medications:  No discharge medications.     Scribe  Disclosure:  I, Yanelis Watters, am serving as a scribe at 1:12 PM on 4/3/2019 to document services personally performed by Isreal Hyatt MD based on my observations and the provider's statements to me.      [facility] EMERGENCY DEPARTMENT       Isreal Hyatt MD  04/04/19 0715

## 2019-05-21 ENCOUNTER — OFFICE VISIT (OUTPATIENT)
Dept: UROLOGY | Facility: CLINIC | Age: 34
End: 2019-05-21
Payer: MEDICARE

## 2019-05-21 ENCOUNTER — HOSPITAL ENCOUNTER (OUTPATIENT)
Dept: ULTRASOUND IMAGING | Facility: CLINIC | Age: 34
Discharge: HOME | End: 2019-05-21
Attending: PHYSICIAN ASSISTANT | Admitting: PHYSICIAN ASSISTANT
Payer: MEDICARE

## 2019-05-21 VITALS — HEIGHT: 71 IN | WEIGHT: 260 LBS | BODY MASS INDEX: 36.4 KG/M2 | HEART RATE: 78 BPM

## 2019-05-21 DIAGNOSIS — R10.32 LEFT INGUINAL PAIN: ICD-10-CM

## 2019-05-21 DIAGNOSIS — R10.30 GROIN PAIN: Primary | ICD-10-CM

## 2019-05-21 DIAGNOSIS — N50.82 SCROTAL PAIN: ICD-10-CM

## 2019-05-21 DIAGNOSIS — N50.82 SCROTAL PAIN: Primary | ICD-10-CM

## 2019-05-21 LAB
ALBUMIN UR-MCNC: NEGATIVE MG/DL
APPEARANCE UR: CLEAR
BILIRUB UR QL STRIP: NEGATIVE
COLOR UR AUTO: YELLOW
GLUCOSE UR STRIP-MCNC: NEGATIVE MG/DL
HGB UR QL STRIP: NEGATIVE
KETONES UR STRIP-MCNC: NEGATIVE MG/DL
LEUKOCYTE ESTERASE UR QL STRIP: NEGATIVE
NITRATE UR QL: NEGATIVE
PH UR STRIP: 7.5 PH (ref 5–7)
SOURCE: ABNORMAL
SP GR UR STRIP: 1.02 (ref 1–1.03)
UROBILINOGEN UR STRIP-ACNC: 0.2 EU/DL (ref 0.2–1)

## 2019-05-21 PROCEDURE — 81003 URINALYSIS AUTO W/O SCOPE: CPT | Mod: QW | Performed by: PHYSICIAN ASSISTANT

## 2019-05-21 PROCEDURE — 51798 US URINE CAPACITY MEASURE: CPT | Performed by: PHYSICIAN ASSISTANT

## 2019-05-21 PROCEDURE — 99204 OFFICE O/P NEW MOD 45 MIN: CPT | Mod: 25 | Performed by: PHYSICIAN ASSISTANT

## 2019-05-21 PROCEDURE — 93976 VASCULAR STUDY: CPT

## 2019-05-21 RX ORDER — FLUTICASONE PROPIONATE 50 MCG
SPRAY, SUSPENSION (ML) NASAL
COMMUNITY
Start: 2018-11-01

## 2019-05-21 ASSESSMENT — PAIN SCALES - GENERAL: PAINLEVEL: SEVERE PAIN (6)

## 2019-05-21 ASSESSMENT — MIFFLIN-ST. JEOR: SCORE: 2141.48

## 2019-05-21 NOTE — LETTER
5/21/2019     RE: Yuan Hensley  [address]  [facility] [address]     Dear Colleague,    Thank you for referring your patient, Yuan Hensley, to the [facility] [place] at [facility]. Please see a copy of my visit note below.    May 21, 2019      CC: testicle pain    HPI:  Yuan Hensley is a pleasant 33 year old male who presents for evaluation of the above. Off and on since he was 9 years old. Mom here to help with the history. Thought at one point at 9 yrs that he had a torsion but this was ruled out at [facility] ER.  Scrotal US 2 years ago was benign. Can have pain that radiates into the left groin and leg. No concern for STDs. Has some concern about having testicular cancer.     Soda drinker.    Difficult for him to take pills.     Past Medical History:   Diagnosis Date     Depressive disorder      Fibromyalgia      Gastro-oesophageal reflux disease      Hypertension        Past Surgical History:   Procedure Laterality Date     ESOPHAGOSCOPY, GASTROSCOPY, DUODENOSCOPY (EGD), COMBINED  9/15/2015    Dr. Goldstein [facility]     ESOPHAGOSCOPY, GASTROSCOPY, DUODENOSCOPY (EGD), COMBINED N/A 9/15/2015    Procedure: COMBINED ESOPHAGOSCOPY, GASTROSCOPY, DUODENOSCOPY (EGD), BIOPSY SINGLE OR MULTIPLE;  Surgeon: Brandt Goldstein MD;  Location:  GI     HEAD & NECK SURGERY      1 wisdom tooth removed     ORTHOPEDIC SURGERY  2007    left shoulder tendon tear scope done     TONSILLECTOMY      T&A age 12 years       Social History     Socioeconomic History     Marital status: Single     Spouse name: Not on file     Number of children: Not on file     Years of education: Not on file     Highest education level: Not on file   Occupational History     Not on file   Social Needs     Financial resource strain: Not on file     Food insecurity:     Worry: Not on file     Inability: Not on file     Transportation needs:     Medical:  Not on file     Non-medical: Not on file   Tobacco Use     Smoking status: Former Smoker     Packs/day: 1.00     Years: 4.00     Pack years: 4.00     Types: Cigarettes     Smokeless tobacco: Never Used     Tobacco comment: quit in about 4274-7628   Substance and Sexual Activity     Alcohol use: Yes     Alcohol/week: 0.0 oz     Comment: very rare, couple times per year     Drug use: No     Sexual activity: Never   Lifestyle     Physical activity:     Days per week: Not on file     Minutes per session: Not on file     Stress: Not on file   Relationships     Social connections:     Talks on phone: Not on file     Gets together: Not on file     Attends Oriental orthodox service: Not on file     Active member of club or organization: Not on file     Attends meetings of clubs or organizations: Not on file     Relationship status: Not on file     Intimate partner violence:     Fear of current or ex partner: Not on file     Emotionally abused: Not on file     Physically abused: Not on file     Forced sexual activity: Not on file   Other Topics Concern     Parent/sibling w/ CABG, MI or angioplasty before 65F 55M? Not Asked   Social History Narrative     Not on file       Family History   Problem Relation Age of Onset     Diabetes Mother      Kidney Disease Mother         Kidney stones     Gastrointestinal Disease Mother         Acid Reflux     Kidney Disease Father         Kidney Cancer     Diabetes Father      Diabetes Maternal Grandmother      Cancer - colorectal Maternal Grandmother      Parkinsonism Maternal Grandfather      Diabetes Maternal Grandfather      Dementia Maternal Grandfather      Colon Cancer Maternal Grandfather      Diabetes Paternal Grandmother        ROS:14 point ROS neg other than the symptoms noted above in the HPI.    Allergies   Allergen Reactions     Bupropion      PN: LW Reaction: tongue numbness     Ceclor Cd [Cefaclor Monohydrate] Rash       Current Outpatient Medications   Medication     albuterol  "(ALBUTEROL) 108 (90 BASE) MCG/ACT Inhaler     fluticasone (FLONASE) 50 MCG/ACT nasal spray     fluticasone (FLONASE) 50 MCG/ACT nasal spray     nebivolol (BYSTOLIC) 5 MG tablet     omeprazole (PRILOSEC) 40 MG capsule     order for DME     No current facility-administered medications for this visit.          PEx:   Pulse 78, height 1.803 m (5' 11\"), weight 117.9 kg (260 lb).  5' 11\", Body mass index is 36.26 kg/m ., 260 lbs 0 oz  Gen appearance:  Well groomed,: age-appropriate appearing male in NAD.   HEENT:  EOMI, AT NC, CN grossly normal  Psych:  alert , comfortable in no acute distress  Neuro:  A/O X 3  Skin:  Warm to touch, clear of rashes, ecchymoses  Resp:  No increased respiratory effort  lymph:  No LE edema  Abd:  Soft/NT, ND, no palpable masses, no CVAT  Back: bony spine is non-tender, flanks are nontender  :      Circ penis, no penile plaques or lesions.      Orthotopic location of the urethral meatus.     Scrotum normal.     Testicles of normal firmness and consistency, no masses.     Epididymes bilaterally without masses, right no tenderness with palpation, very tender on the left.     Vas and cord normal bilaterally.    Urine: pend  PVR 37cc      A/P: Yuan MYRA Hensley is a 33 year old male with chronic scrotal pain  -Semen culture to r/u bacterial prostatitis  -Scrotal US with valsalva; will call with results  -May need pelvic floor PT if above is normal. Also discussed referral to Dr. Rodgers for chronic pain evaluation.     Melodie Schmitt PA-C  [facility]    30 minutes were spent with the patient today, > 50% in counseling and coordination of care      "

## 2019-05-21 NOTE — PROGRESS NOTES
May 21, 2019      CC: testicle pain    HPI:  Yuan Hensley is a pleasant 33 year old male who presents for evaluation of the above. Off and on since he was 9 years old. Mom here to help with the history. Thought at one point at 9 yrs that he had a torsion but this was ruled out at [facility] ER.  Scrotal US 2 years ago was benign. Can have pain that radiates into the left groin and leg. No concern for STDs. Has some concern about having testicular cancer.     Soda drinker.    Difficult for him to take pills.     Past Medical History:   Diagnosis Date     Depressive disorder      Fibromyalgia      Gastro-oesophageal reflux disease      Hypertension        Past Surgical History:   Procedure Laterality Date     ESOPHAGOSCOPY, GASTROSCOPY, DUODENOSCOPY (EGD), COMBINED  9/15/2015    Dr. Goldstein [facility]     ESOPHAGOSCOPY, GASTROSCOPY, DUODENOSCOPY (EGD), COMBINED N/A 9/15/2015    Procedure: COMBINED ESOPHAGOSCOPY, GASTROSCOPY, DUODENOSCOPY (EGD), BIOPSY SINGLE OR MULTIPLE;  Surgeon: Brandt Goldstein MD;  Location:  GI     HEAD & NECK SURGERY      1 wisdom tooth removed     ORTHOPEDIC SURGERY  2007    left shoulder tendon tear scope done     TONSILLECTOMY      T&A age 12 years       Social History     Socioeconomic History     Marital status: Single     Spouse name: Not on file     Number of children: Not on file     Years of education: Not on file     Highest education level: Not on file   Occupational History     Not on file   Social Needs     Financial resource strain: Not on file     Food insecurity:     Worry: Not on file     Inability: Not on file     Transportation needs:     Medical: Not on file     Non-medical: Not on file   Tobacco Use     Smoking status: Former Smoker     Packs/day: 1.00     Years: 4.00     Pack years: 4.00     Types: Cigarettes     Smokeless tobacco: Never Used     Tobacco comment: quit in about 5724-2723   Substance and Sexual Activity     Alcohol use: Yes     Alcohol/week: 0.0  oz     Comment: very rare, couple times per year     Drug use: No     Sexual activity: Never   Lifestyle     Physical activity:     Days per week: Not on file     Minutes per session: Not on file     Stress: Not on file   Relationships     Social connections:     Talks on phone: Not on file     Gets together: Not on file     Attends Faith service: Not on file     Active member of club or organization: Not on file     Attends meetings of clubs or organizations: Not on file     Relationship status: Not on file     Intimate partner violence:     Fear of current or ex partner: Not on file     Emotionally abused: Not on file     Physically abused: Not on file     Forced sexual activity: Not on file   Other Topics Concern     Parent/sibling w/ CABG, MI or angioplasty before 65F 55M? Not Asked   Social History Narrative     Not on file       Family History   Problem Relation Age of Onset     Diabetes Mother      Kidney Disease Mother         Kidney stones     Gastrointestinal Disease Mother         Acid Reflux     Kidney Disease Father         Kidney Cancer     Diabetes Father      Diabetes Maternal Grandmother      Cancer - colorectal Maternal Grandmother      Parkinsonism Maternal Grandfather      Diabetes Maternal Grandfather      Dementia Maternal Grandfather      Colon Cancer Maternal Grandfather      Diabetes Paternal Grandmother        ROS:14 point ROS neg other than the symptoms noted above in the HPI.    Allergies   Allergen Reactions     Bupropion      PN: LW Reaction: tongue numbness     Ceclor Cd [Cefaclor Monohydrate] Rash       Current Outpatient Medications   Medication     albuterol (ALBUTEROL) 108 (90 BASE) MCG/ACT Inhaler     fluticasone (FLONASE) 50 MCG/ACT nasal spray     fluticasone (FLONASE) 50 MCG/ACT nasal spray     nebivolol (BYSTOLIC) 5 MG tablet     omeprazole (PRILOSEC) 40 MG capsule     order for DME     No current facility-administered medications for this visit.          PEx:   Pulse 78,  "height 1.803 m (5' 11\"), weight 117.9 kg (260 lb).  5' 11\", Body mass index is 36.26 kg/m ., 260 lbs 0 oz  Gen appearance:  Well groomed,: age-appropriate appearing male in NAD.   HEENT:  EOMI, AT NC, CN grossly normal  Psych:  alert , comfortable in no acute distress  Neuro:  A/O X 3  Skin:  Warm to touch, clear of rashes, ecchymoses  Resp:  No increased respiratory effort  lymph:  No LE edema  Abd:  Soft/NT, ND, no palpable masses, no CVAT  Back: bony spine is non-tender, flanks are nontender  :      Circ penis, no penile plaques or lesions.      Orthotopic location of the urethral meatus.     Scrotum normal.     Testicles of normal firmness and consistency, no masses.     Epididymes bilaterally without masses, right no tenderness with palpation, very tender on the left.     Vas and cord normal bilaterally.    Urine: pend  PVR 37cc      A/P: Yuan MYRA Hensley is a 33 year old male with chronic scrotal pain  -Semen culture to r/u bacterial prostatitis  -Scrotal US with valsalva; will call with results  -May need pelvic floor PT if above is normal. Also discussed referral to Dr. Rodgers for chronic pain evaluation.     Melodie Schmitt PA-C  [facility]rology    30 minutes were spent with the patient today, > 50% in counseling and coordination of care                "

## 2019-05-21 NOTE — PATIENT INSTRUCTIONS
Below is a list of things that can irritate the bladder and should be eliminated:      Caffeinated soft drinks.    Coffee.    Tea.    Chocolate.    Tomato-based foods.    Acidic juices and fruits. (includes cranberry juice)    Alcohol.    Carbonated drinks.    Aspartame/Nutrasweet.    Scrotal US (will call with results)  Semen culture (will call with results)  May need pelvic floor physical therapy pending our results.

## 2019-05-22 ENCOUNTER — TELEPHONE (OUTPATIENT)
Dept: UROLOGY | Facility: CLINIC | Age: 34
End: 2019-05-22

## 2019-05-22 DIAGNOSIS — R10.32 LEFT INGUINAL PAIN: ICD-10-CM

## 2019-05-22 DIAGNOSIS — N50.82 SCROTAL PAIN: ICD-10-CM

## 2019-05-22 PROCEDURE — 87070 CULTURE OTHR SPECIMN AEROBIC: CPT | Performed by: PHYSICIAN ASSISTANT

## 2019-05-22 NOTE — TELEPHONE ENCOUNTER
Patient called nurse line and wanted to know results of recent ultrasound. Will forward message to JORDAN Oneill LPN

## 2019-05-24 ENCOUNTER — TELEPHONE (OUTPATIENT)
Dept: UROLOGY | Facility: CLINIC | Age: 34
End: 2019-05-24

## 2019-05-24 LAB
BACTERIA SPEC CULT: NORMAL
SPECIMEN SOURCE: NORMAL

## 2019-05-24 NOTE — TELEPHONE ENCOUNTER
Called [facility] with results and direction as below.    ----- Message from Melodie Schmitt PA-C sent at 5/24/2019  8:27 AM CDT -----  Culture results normal, please call pt and let them know. This does not change prev rec for pelvic floor PT and appt with Dr. Rodgers.   LASHANDA TORRES

## 2019-05-31 DIAGNOSIS — N50.82 SCROTAL PAIN: Primary | ICD-10-CM

## 2019-05-31 DIAGNOSIS — R10.2 PELVIC PAIN IN MALE: ICD-10-CM

## 2019-07-10 ENCOUNTER — PRE VISIT (OUTPATIENT)
Dept: UROLOGY | Facility: CLINIC | Age: 34
End: 2019-07-10

## 2019-07-10 NOTE — TELEPHONE ENCOUNTER
Reason for Visit: Consult    Diagnosis: scrotal/groin/pelvic pain    Orders/Procedures/Records: n/a    Contact Patient: n/a    Rooming Requirements: normal      Nathaly Calvin LPN  07/10/19  7:40 AM

## 2019-07-18 ENCOUNTER — OFFICE VISIT (OUTPATIENT)
Dept: UROLOGY | Facility: CLINIC | Age: 34
End: 2019-07-18
Payer: MEDICARE

## 2019-07-18 VITALS
HEART RATE: 86 BPM | WEIGHT: 275 LBS | HEIGHT: 71 IN | SYSTOLIC BLOOD PRESSURE: 146 MMHG | BODY MASS INDEX: 38.5 KG/M2 | DIASTOLIC BLOOD PRESSURE: 103 MMHG

## 2019-07-18 DIAGNOSIS — N50.819 TESTIS PAIN: Primary | ICD-10-CM

## 2019-07-18 ASSESSMENT — PAIN SCALES - GENERAL: PAINLEVEL: MILD PAIN (2)

## 2019-07-18 ASSESSMENT — PATIENT HEALTH QUESTIONNAIRE - PHQ9
SUM OF ALL RESPONSES TO PHQ QUESTIONS 1-9: 8
SUM OF ALL RESPONSES TO PHQ QUESTIONS 1-9: 8
10. IF YOU CHECKED OFF ANY PROBLEMS, HOW DIFFICULT HAVE THESE PROBLEMS MADE IT FOR YOU TO DO YOUR WORK, TAKE CARE OF THINGS AT HOME, OR GET ALONG WITH OTHER PEOPLE: NOT DIFFICULT AT ALL

## 2019-07-18 ASSESSMENT — MIFFLIN-ST. JEOR: SCORE: 2204.52

## 2019-07-18 NOTE — LETTER
7/18/2019       RE: Yuan Hensley  [address]  [facility] [address]     Dear Colleague,    Thank you for referring your patient, Yuan Hensley, to the [facility] UROLOGY AND INST FOR PROSTATE AND UROLOGIC CANCERS at [facility]. Please see a copy of my visit note below.    Urology Clinic Note      Date: 7/18/2019  Time: 7:59 AM  Patient: Yuan Hensley  MRN: [id number]    Consult from Melodie Schmitt for chronic left testis pain.    HPI/Subjective: Yuan Hensley is a 34 year old male with a hx of fibromyalgia and depression who presents to clinic today for follow for chronic left scrotal pain. Was seen by Melodie Schmitt PA-C, on 5/21/2019 for this pain. Had repeat US that showed mild bilateral hydroceles but otherwise unremarkable. UA and semen cultures were unremarkable, no concern for UTI or bacterial prostatitis. He notes pain is always present at a low level but flares up for 2-3 weeks at a time every few months. He also has chronic low back pain. Does not recall any specific injuries but does a lot of heavy lifting for work. Patient notes that he doesn't usually have leg pain associated with his left  scrotal pain but during his last episode prior to his last clinic visit he had associated shooting pain from his back to his left leg.  He has not had this before and has not experienced this since that time. Denies recent fevers, chills, nausea, vomiting, or any other new concerns.     Family History   Problem Relation Age of Onset     Diabetes Mother      Kidney Disease Mother         Kidney stones     Gastrointestinal Disease Mother         Acid Reflux     Kidney Disease Father         Kidney Cancer     Diabetes Father      Diabetes Maternal Grandmother      Cancer - colorectal Maternal Grandmother      Parkinsonism Maternal Grandfather      Diabetes Maternal Grandfather      Dementia Maternal Grandfather      Colon Cancer Maternal Grandfather       Diabetes Paternal Grandmother       Social History     Socioeconomic History     Marital status: Single     Spouse name: None     Number of children: None     Years of education: None     Highest education level: None   Occupational History     None   Social Needs     Financial resource strain: None     Food insecurity:     Worry: None     Inability: None     Transportation needs:     Medical: None     Non-medical: None   Tobacco Use     Smoking status: Former Smoker     Packs/day: 1.00     Years: 4.00     Pack years: 4.00     Types: Cigarettes     Smokeless tobacco: Never Used     Tobacco comment: quit in about 3618-4352   Substance and Sexual Activity     Alcohol use: Yes     Alcohol/week: 0.0 oz     Comment: very rare, couple times per year     Drug use: No     Sexual activity: Never   Lifestyle     Physical activity:     Days per week: None     Minutes per session: None     Stress: None   Relationships     Social connections:     Talks on phone: None     Gets together: None     Attends Latter-day service: None     Active member of club or organization: None     Attends meetings of clubs or organizations: None     Relationship status: None     Intimate partner violence:     Fear of current or ex partner: None     Emotionally abused: None     Physically abused: None     Forced sexual activity: None   Other Topics Concern     Parent/sibling w/ CABG, MI or angioplasty before 65F 55M? Not Asked   Social History Narrative     None       Past Medical History:   Diagnosis Date     Depressive disorder      Fibromyalgia      Gastro-oesophageal reflux disease      Hypertension         Current Outpatient Medications   Medication     albuterol (ALBUTEROL) 108 (90 BASE) MCG/ACT Inhaler     fluticasone (FLONASE) 50 MCG/ACT nasal spray     fluticasone (FLONASE) 50 MCG/ACT nasal spray     nebivolol (BYSTOLIC) 5 MG tablet     omeprazole (PRILOSEC) 40 MG capsule     order for DME     No current facility-administered medications for  "this visit.          ROS: 10 point ROS neg other than the symptoms noted above in the HPI.    Objective:  BP (!) 146/103   Pulse 86   Ht 1.803 m (5' 11\")   Wt 124.7 kg (275 lb)   BMI 38.35 kg/m     GENERAL: No acute distress. Well nourished.   HEENT:  Sclerae anicteric.  Conjunctivae pink.  Moist mucous membranes.  CARDIAC:  Regular rate and rhythm.  LUNGS:  Non-labored breathing  BACK:  No costovertebral tenderness.  :  Phallus circumcised, meatus adequate, no plaques palpated. Testes descended bilaterally, no intratesticular masses. Testicular volume approximately 8 ml bilaterally. Epididymes non-tender.  No varicoceles, hydroceles or inguinal hernias.  SKIN: No rashes.  Dry.     EXTREMITIES:  Warm, well perfused.  No lower extremity edema bilaterally.  NEURO: normal gait, no focal deficits.     Labs:  Component      Latest Ref Rng & Units 5/21/2019 5/22/2019   Color Urine       Yellow    Appearance Urine       Clear    Glucose Urine      NEG:Negative mg/dL Negative    Bilirubin Urine      NEG:Negative Negative    Ketones Urine      NEG:Negative mg/dL Negative    Specific Gravity Urine      1.003 - 1.035 1.020    Blood Urine      NEG:Negative Negative    pH Urine      5.0 - 7.0 pH 7.5 (H)    Protein Albumin Urine      NEG:Negative mg/dL Negative    Urobilinogen Urine      0.2 - 1.0 EU/dL 0.2    Nitrite Urine      NEG:Negative Negative    Leukocyte Esterase Urine      NEG:Negative Negative    Source       Midstream Urine      Semen culture 5/22/19 showed light growth normal rosa.    Imaging:  Scrotal ultrasound 5/21/19                                                                    IMPRESSION: Small bilateral hydroceles. Otherwise unremarkable  testicular ultrasound.       CT abdomen/pelvis 1/4/19                                                                   IMPRESSION: Unremarkable CT abdomen and pelvis.        Assessment & Plan: Yuan Hensley is a 34 year old male with a history of fibromyalgia, " now presenting with chronic left testis pain x 25 years.  Also has some pain that radiates down back of leg to foot.  Uncertain etiology for his pain.    Chronic pain in epididymis. Options I discussed with him today were:  1.  Observation  2. Referral to pain specialist for consideration regional anesthesia or nerve block procedures.  3. If he responds completely to a cord block with 1% lidocaine: microscopic denervation of the spermatic cord ( estimate 100% chance pain improvement, 80+% chance pain cure in my personal experience).  4. Orchiectomy, estimate 95% pain cure, but could have phantom pain.     Recommended ortho workup, especially since having pain down the back of left leg.  Return to clinic for left cord block if pain is severe enough to warrant surgery.  Discussed various surgery options such as microscopic denervation of the spermatic cord or orchiectomy.    Discussed detailed risks of microscopic denervation of the spermatic cord surgery.  These include damage to artery (ischemia/atrophy), damage to lymphatics ( hydrocele), hypogonadism requiring testosterone replacement, as well as risk of continued pain.    This patient was seen & discussed with Dr. Vishal Byrd MD  Urology Resident     I saw and examined the patient with the resident today.  I agree with the resident note and plan of care as above.     Navin Rodgers MD  Urology Staff          Date: 7/18/2019  Time: 7:59 AM  Patient: Yuan Hensley  MRN: [id number]     HPI/Subjective: Yuan Hensley is a 34 year old male with a hx of fibromyalgia and depression who presents to clinic today for follow for chronic left scrotal pain. Was seen by Melodie Schmitt PA-C, on 5/21/2019 for this pain. Had repeat US that showed mild bilat. Hydroceles but otherwise unremarkable. UA and semen cultures were unremarkable, no concern for UTI or bacterial prostatitis. He notes pain is always present at a low level but flares up for 2-3 weeks at  a time every few months. He also has chronic low back pain. Does not recall any specific injuries but does a lot of heavy lifting for work. Patient notes that he doesn't usually have leg pain associated with his L scrotal pain but during his last episode prior to his last clinic visit he had associated shooting pain from his back to his left leg.  He has not had this before and has not experienced this since that time. Denies recent fevers, chills, nausea, vomiting, or any other new concerns.      Objective:  There were no vitals taken for this visit.  GENERAL: No acute distress. Well nourished.   HEENT:  Sclerae anicteric.  Conjunctivae pink.  Moist mucous membranes.  CARDIAC:  Regular rate and rhythm.  LUNGS:  Non-labored breathing  BACK:  No costovertebral tenderness.  :  Phallus circumcised, meatus adequate, no plaques palpated. Testes descended bilaterally, no intratesticular masses. Testicular volume approximately 8 ml bilaterally. Epididym is non-tender on right, mildly tender to palpation on left.  No varicoceles, hydroceles or inguinal hernias.  SKIN: No rashes.  Dry.     EXTREMITIES:  Warm, well perfused.   No lower extremity edema bilaterally.  NEURO: normal gait, no focal deficits.      Labs:  Semen culture 5/22/2019:normal urogenital rosa, light growth  UA 5/21/2019: WNL     Imaging:  US Testicle 7/18/2019:  IMPRESSION: Small bilateral hydroceles. Otherwise unremarkable  testicular ultrasound.     Assessment & Plan: Yuan Hensley is a 34 year old male with a history of fibromyalgia, presenting with chronic left testicular pain that he has been experiencing for approximately 25 years. Workup (above) has been unremarkable. No pathology revealed that could easily explain his pain. Unclear whether there could be a component of pain associated with his chronic low back pain, possible neuropathic in nature. Recommended further workup as below. Discussion was had regarding urologic surgical options and  patient does not seem eager to pursue this at this time.      Chronic pain in epididymis. Options I discussed with him today were:  1. Observation  2. Referral to pain specialist for consideration regional anesthesia or nerve block procedures.  3. If he responds completely to a cord block with 1% lidocaine: microscopic denervation of the spermatic cord ( estimate 100% chance pain improvement, 80+% chance pain cure in my personal experience).  4. Orchiectomy, estimate 95% pain cure, but could have phantom pain.   5. Recommended ortho/spine workup, especially since having pain down the back of left leg.  6. Return to clinic for left cord block if pain is severe enough to warrant surgery.        This patient was seen & discussed with Dr. Vishal Byrd MD  Urology Resident   Answers for HPI/ROS submitted by the patient on 7/18/2019   If you checked off any problems, how difficult have these problems made it for you to do your work, take care of things at home, or get along with other people?: Not difficult at all  PHQ9 TOTAL SCORE: 8      Again, thank you for allowing me to participate in the care of your patient.      Sincerely,    Navin Rodgers MD

## 2019-07-18 NOTE — NURSING NOTE
"New    Left sided groin/testicle pain over the past few years off and on, he describes as pressure and is very sensitive and denies any ED sx or pains.    When he was 10-12 yr old he had possible torsion, but stated it was not.      Chief Complaint   Patient presents with     New Patient     L Groin Pain       Blood pressure (!) 146/103, pulse 86, height 1.803 m (5' 11\"), weight 124.7 kg (275 lb). Body mass index is 38.35 kg/m .    Patient Active Problem List   Diagnosis     GERD (gastroesophageal reflux disease)     Costochondritis     Moderate major depression (H)     Tremor     Learning disabilities     CARDIOVASCULAR SCREENING; LDL GOAL LESS THAN 160     Cervicalgia     Lumbago     Physiologic anisocoria     LULA positive     HTN (hypertension)     Patellar pain     Hypertension     Fibromyalgia muscle pain     Knee pain     Obesity     Health Care Home     Eosinophilic esophagitis     Anxiety     Adhesive capsulitis of shoulder     Pain in joint, shoulder region     Headache     Painful respiration     Plantar fasciitis     Tendonitis, Achilles, right     Essential hypertension, benign     Elevated glucose     Low vitamin D level     Morbid obesity due to excess calories (H)     Prediabetes       Allergies   Allergen Reactions     Bupropion      PN: LW Reaction: tongue numbness     Ceclor Cd [Cefaclor Monohydrate] Rash       Current Outpatient Medications   Medication Sig Dispense Refill     albuterol (ALBUTEROL) 108 (90 BASE) MCG/ACT Inhaler Inhale 2 puffs into the lungs every 6 hours as needed for shortness of breath / dyspnea 1 Inhaler 1     fluticasone (FLONASE) 50 MCG/ACT nasal spray Place 1 Spray into both nostrils two times a day. After nasal rinse       fluticasone (FLONASE) 50 MCG/ACT nasal spray Spray 1-2 sprays into both nostrils daily 16 g 0     nebivolol (BYSTOLIC) 5 MG tablet Take 1 tablet (5 mg) by mouth daily 30 tablet 3     omeprazole (PRILOSEC) 40 MG capsule Take 1 capsule (40 mg) by mouth 2 " times daily Take 30-60 minutes before a meal. 60 capsule 10     order for DME Equipment being ordered: right wrist splint 1 each 0       Social History     Tobacco Use     Smoking status: Former Smoker     Packs/day: 1.00     Years: 4.00     Pack years: 4.00     Types: Cigarettes     Smokeless tobacco: Never Used     Tobacco comment: quit in about 4898-3239   Substance Use Topics     Alcohol use: Yes     Alcohol/week: 0.0 oz     Comment: very rare, couple times per year     Drug use: No       Get Oneill, EMT  7/18/2019  8:02 AM

## 2019-07-18 NOTE — PROGRESS NOTES
Urology Clinic Note      Date: 7/18/2019  Time: 7:59 AM  Patient: Yuan Hensley  MRN: [id number]    Consult from Melodie Schmitt for chronic left testis pain.    HPI/Subjective: Yuan Hensley is a 34 year old male with a hx of fibromyalgia and depression who presents to clinic today for follow for chronic left scrotal pain. Was seen by Melodie Schmitt PA-C, on 5/21/2019 for this pain. Had repeat US that showed mild bilateral hydroceles but otherwise unremarkable. UA and semen cultures were unremarkable, no concern for UTI or bacterial prostatitis. He notes pain is always present at a low level but flares up for 2-3 weeks at a time every few months. He also has chronic low back pain. Does not recall any specific injuries but does a lot of heavy lifting for work. Patient notes that he doesn't usually have leg pain associated with his left  scrotal pain but during his last episode prior to his last clinic visit he had associated shooting pain from his back to his left leg.  He has not had this before and has not experienced this since that time. Denies recent fevers, chills, nausea, vomiting, or any other new concerns.     Family History   Problem Relation Age of Onset     Diabetes Mother      Kidney Disease Mother         Kidney stones     Gastrointestinal Disease Mother         Acid Reflux     Kidney Disease Father         Kidney Cancer     Diabetes Father      Diabetes Maternal Grandmother      Cancer - colorectal Maternal Grandmother      Parkinsonism Maternal Grandfather      Diabetes Maternal Grandfather      Dementia Maternal Grandfather      Colon Cancer Maternal Grandfather      Diabetes Paternal Grandmother       Social History     Socioeconomic History     Marital status: Single     Spouse name: None     Number of children: None     Years of education: None     Highest education level: None   Occupational History     None   Social Needs     Financial resource strain: None     Food insecurity:      "Worry: None     Inability: None     Transportation needs:     Medical: None     Non-medical: None   Tobacco Use     Smoking status: Former Smoker     Packs/day: 1.00     Years: 4.00     Pack years: 4.00     Types: Cigarettes     Smokeless tobacco: Never Used     Tobacco comment: quit in about [id number]   Substance and Sexual Activity     Alcohol use: Yes     Alcohol/week: 0.0 oz     Comment: very rare, couple times per year     Drug use: No     Sexual activity: Never   Lifestyle     Physical activity:     Days per week: None     Minutes per session: None     Stress: None   Relationships     Social connections:     Talks on phone: None     Gets together: None     Attends Christian service: None     Active member of club or organization: None     Attends meetings of clubs or organizations: None     Relationship status: None     Intimate partner violence:     Fear of current or ex partner: None     Emotionally abused: None     Physically abused: None     Forced sexual activity: None   Other Topics Concern     Parent/sibling w/ CABG, MI or angioplasty before 65F 55M? Not Asked   Social History Narrative     None       Past Medical History:   Diagnosis Date     Depressive disorder      Fibromyalgia      Gastro-oesophageal reflux disease      Hypertension         Current Outpatient Medications   Medication     albuterol (ALBUTEROL) 108 (90 BASE) MCG/ACT Inhaler     fluticasone (FLONASE) 50 MCG/ACT nasal spray     fluticasone (FLONASE) 50 MCG/ACT nasal spray     nebivolol (BYSTOLIC) 5 MG tablet     omeprazole (PRILOSEC) 40 MG capsule     order for DME     No current facility-administered medications for this visit.          ROS: 10 point ROS neg other than the symptoms noted above in the HPI.    Objective:  BP (!) 146/103   Pulse 86   Ht 1.803 m (5' 11\")   Wt 124.7 kg (275 lb)   BMI 38.35 kg/m    GENERAL: No acute distress. Well nourished.   HEENT:  Sclerae anicteric.  Conjunctivae pink.  Moist mucous " membranes.  CARDIAC:  Regular rate and rhythm.  LUNGS:  Non-labored breathing  BACK:  No costovertebral tenderness.  :  Phallus circumcised, meatus adequate, no plaques palpated. Testes descended bilaterally, no intratesticular masses. Testicular volume approximately 8 ml bilaterally. Epididymes non-tender.  No varicoceles, hydroceles or inguinal hernias.  SKIN: No rashes.  Dry.     EXTREMITIES:  Warm, well perfused.  No lower extremity edema bilaterally.  NEURO: normal gait, no focal deficits.     Labs:  Component      Latest Ref Rng & Units 5/21/2019 5/22/2019   Color Urine       Yellow    Appearance Urine       Clear    Glucose Urine      NEG:Negative mg/dL Negative    Bilirubin Urine      NEG:Negative Negative    Ketones Urine      NEG:Negative mg/dL Negative    Specific Gravity Urine      1.003 - 1.035 1.020    Blood Urine      NEG:Negative Negative    pH Urine      5.0 - 7.0 pH 7.5 (H)    Protein Albumin Urine      NEG:Negative mg/dL Negative    Urobilinogen Urine      0.2 - 1.0 EU/dL 0.2    Nitrite Urine      NEG:Negative Negative    Leukocyte Esterase Urine      NEG:Negative Negative    Source       Midstream Urine      Semen culture 5/22/19 showed light growth normal rosa.    Imaging:  Scrotal ultrasound 5/21/19                                                                    IMPRESSION: Small bilateral hydroceles. Otherwise unremarkable  testicular ultrasound.       CT abdomen/pelvis 1/4/19                                                                   IMPRESSION: Unremarkable CT abdomen and pelvis.        Assessment & Plan: Yuan Hensley is a 34 year old male with a history of fibromyalgia, now presenting with chronic left testis pain x 25 years.  Also has some pain that radiates down back of leg to foot.  Uncertain etiology for his pain.    Chronic pain in epididymis. Options I discussed with him today were:  1.  Observation  2. Referral to pain specialist for consideration regional  anesthesia or nerve block procedures.  3. If he responds completely to a cord block with 1% lidocaine: microscopic denervation of the spermatic cord ( estimate 100% chance pain improvement, 80+% chance pain cure in my personal experience).  4. Orchiectomy, estimate 95% pain cure, but could have phantom pain.     Recommended ortho workup, especially since having pain down the back of left leg.  Return to clinic for left cord block if pain is severe enough to warrant surgery.  Discussed various surgery options such as microscopic denervation of the spermatic cord or orchiectomy.    Discussed detailed risks of microscopic denervation of the spermatic cord surgery.  These include damage to artery (ischemia/atrophy), damage to lymphatics ( hydrocele), hypogonadism requiring testosterone replacement, as well as risk of continued pain.    This patient was seen & discussed with Dr. Vishal Byrd MD  Urology Resident     I saw and examined the patient with the resident today.  I agree with the resident note and plan of care as above.     Navin Rodgers MD  Urology Staff

## 2019-07-18 NOTE — PROGRESS NOTES
Date: 7/18/2019  Time: 7:59 AM  Patient: Yuan Hensley  MRN: [id number]     HPI/Subjective: Yuan Hensley is a 34 year old male with a hx of fibromyalgia and depression who presents to clinic today for follow for chronic left scrotal pain. Was seen by Melodie Schmitt PA-C, on 5/21/2019 for this pain. Had repeat US that showed mild bilat. Hydroceles but otherwise unremarkable. UA and semen cultures were unremarkable, no concern for UTI or bacterial prostatitis. He notes pain is always present at a low level but flares up for 2-3 weeks at a time every few months. He also has chronic low back pain. Does not recall any specific injuries but does a lot of heavy lifting for work. Patient notes that he doesn't usually have leg pain associated with his L scrotal pain but during his last episode prior to his last clinic visit he had associated shooting pain from his back to his left leg.  He has not had this before and has not experienced this since that time. Denies recent fevers, chills, nausea, vomiting, or any other new concerns.      Objective:  There were no vitals taken for this visit.  GENERAL: No acute distress. Well nourished.   HEENT:  Sclerae anicteric.  Conjunctivae pink.  Moist mucous membranes.  CARDIAC:  Regular rate and rhythm.  LUNGS:  Non-labored breathing  BACK:  No costovertebral tenderness.  :  Phallus circumcised, meatus adequate, no plaques palpated. Testes descended bilaterally, no intratesticular masses. Testicular volume approximately 8 ml bilaterally. Epididymis non-tender on right, mildly tender to palpation on left.  No varicoceles, hydroceles or inguinal hernias.  SKIN: No rashes.  Dry.     EXTREMITIES:  Warm, well perfused.  No lower extremity edema bilaterally.  NEURO: normal gait, no focal deficits.      Labs:  Semen culture 5/22/2019:normal urogenital rosa, light growth  UA 5/21/2019: WNL     Imaging:  US Testicle 7/18/2019:  IMPRESSION: Small bilateral hydroceles. Otherwise  unremarkable  testicular ultrasound.     Assessment & Plan: Yuan Hensley is a 34 year old male with a history of fibromyalgia, presenting with chronic left testicular pain that he has been experiencing for approximately 25 years. Workup (above) has been unremarkable. No pathology revealed that could easily explain his pain. Unclear whether there could be a component of pain associated with his chronic low back pain, possible neuropathic in nature. Recommended further workup as below. Discussion was had regarding urologic surgical options and patient does not seem eager to pursue this at this time.      Chronic pain in epididymis. Options I discussed with him today were:  1. Observation  2. Referral to pain specialist for consideration regional anesthesia or nerve block procedures.  3. If he responds completely to a cord block with 1% lidocaine: microscopic denervation of the spermatic cord ( estimate 100% chance pain improvement, 80+% chance pain cure in my personal experience).  4. Orchiectomy, estimate 95% pain cure, but could have phantom pain.   5. Recommended ortho/spine workup, especially since having pain down the back of left leg.  6. Return to clinic for left cord block if pain is severe enough to warrant surgery.        This patient was seen & discussed with Dr. Vishal Byrd MD  Urology Resident   Answers for HPI/ROS submitted by the patient on 7/18/2019   If you checked off any problems, how difficult have these problems made it for you to do your work, take care of things at home, or get along with other people?: Not difficult at all  PHQ9 TOTAL SCORE: 8

## 2019-07-18 NOTE — PATIENT INSTRUCTIONS
Please follow up with Dr. Rodgers/ Urology as needed!    It was a pleasure meeting with you today.  Thank you for allowing me and my team the privilege of caring for you today.  YOU are the reason we are here, and I truly hope we provided you with the excellent service you deserve.  Please let us know if there is anything else we can do for you so that we can be sure you are leaving completely satisfied with your care experience.      Get Oneill

## 2019-07-19 ASSESSMENT — PATIENT HEALTH QUESTIONNAIRE - PHQ9: SUM OF ALL RESPONSES TO PHQ QUESTIONS 1-9: 8

## 2019-10-01 ENCOUNTER — HEALTH MAINTENANCE LETTER (OUTPATIENT)
Age: 34
End: 2019-10-01

## 2019-10-22 ENCOUNTER — APPOINTMENT (OUTPATIENT)
Dept: GENERAL RADIOLOGY | Facility: CLINIC | Age: 34
End: 2019-10-22
Attending: EMERGENCY MEDICINE
Payer: MEDICARE

## 2019-10-22 ENCOUNTER — HOSPITAL ENCOUNTER (EMERGENCY)
Facility: CLINIC | Age: 34
Discharge: HOME | End: 2019-10-22
Attending: EMERGENCY MEDICINE | Admitting: EMERGENCY MEDICINE
Payer: MEDICARE

## 2019-10-22 VITALS
DIASTOLIC BLOOD PRESSURE: 86 MMHG | OXYGEN SATURATION: 94 % | HEART RATE: 79 BPM | SYSTOLIC BLOOD PRESSURE: 134 MMHG | TEMPERATURE: 98.1 F | RESPIRATION RATE: 19 BRPM

## 2019-10-22 DIAGNOSIS — R07.89 CHEST WALL PAIN: ICD-10-CM

## 2019-10-22 LAB
ANION GAP SERPL CALCULATED.3IONS-SCNC: 6 MMOL/L (ref 3–14)
BASOPHILS # BLD AUTO: 0.1 10E9/L (ref 0–0.2)
BASOPHILS NFR BLD AUTO: 0.5 %
BUN SERPL-MCNC: 8 MG/DL (ref 7–30)
CALCIUM SERPL-MCNC: 8.7 MG/DL (ref 8.5–10.1)
CHLORIDE SERPL-SCNC: 107 MMOL/L (ref 94–109)
CO2 SERPL-SCNC: 24 MMOL/L (ref 20–32)
CREAT SERPL-MCNC: 0.75 MG/DL (ref 0.66–1.25)
DIFFERENTIAL METHOD BLD: NORMAL
EOSINOPHIL # BLD AUTO: 0.1 10E9/L (ref 0–0.7)
EOSINOPHIL NFR BLD AUTO: 1 %
ERYTHROCYTE [DISTWIDTH] IN BLOOD BY AUTOMATED COUNT: 12 % (ref 10–15)
GFR SERPL CREATININE-BSD FRML MDRD: >90 ML/MIN/{1.73_M2}
GLUCOSE SERPL-MCNC: 93 MG/DL (ref 70–99)
HCT VFR BLD AUTO: 43.4 % (ref 40–53)
HGB BLD-MCNC: 14.5 G/DL (ref 13.3–17.7)
IMM GRANULOCYTES # BLD: 0.1 10E9/L (ref 0–0.4)
IMM GRANULOCYTES NFR BLD: 0.5 %
INTERPRETATION ECG - MUSE: NORMAL
LYMPHOCYTES # BLD AUTO: 2.4 10E9/L (ref 0.8–5.3)
LYMPHOCYTES NFR BLD AUTO: 22 %
MCH RBC QN AUTO: 30.5 PG (ref 26.5–33)
MCHC RBC AUTO-ENTMCNC: 33.4 G/DL (ref 31.5–36.5)
MCV RBC AUTO: 91 FL (ref 78–100)
MONOCYTES # BLD AUTO: 0.9 10E9/L (ref 0–1.3)
MONOCYTES NFR BLD AUTO: 7.8 %
NEUTROPHILS # BLD AUTO: 7.5 10E9/L (ref 1.6–8.3)
NEUTROPHILS NFR BLD AUTO: 68.2 %
NRBC # BLD AUTO: 0 10*3/UL
NRBC BLD AUTO-RTO: 0 /100
PLATELET # BLD AUTO: 293 10E9/L (ref 150–450)
POTASSIUM SERPL-SCNC: 3.9 MMOL/L (ref 3.4–5.3)
RBC # BLD AUTO: 4.75 10E12/L (ref 4.4–5.9)
SODIUM SERPL-SCNC: 137 MMOL/L (ref 133–144)
TROPONIN I SERPL-MCNC: <0.015 UG/L (ref 0–0.04)
WBC # BLD AUTO: 11 10E9/L (ref 4–11)

## 2019-10-22 PROCEDURE — 84484 ASSAY OF TROPONIN QUANT: CPT | Performed by: EMERGENCY MEDICINE

## 2019-10-22 PROCEDURE — 93005 ELECTROCARDIOGRAM TRACING: CPT

## 2019-10-22 PROCEDURE — 99285 EMERGENCY DEPT VISIT HI MDM: CPT | Mod: 25

## 2019-10-22 PROCEDURE — 71046 X-RAY EXAM CHEST 2 VIEWS: CPT

## 2019-10-22 PROCEDURE — 80048 BASIC METABOLIC PNL TOTAL CA: CPT | Performed by: EMERGENCY MEDICINE

## 2019-10-22 PROCEDURE — 85025 COMPLETE CBC W/AUTO DIFF WBC: CPT | Performed by: EMERGENCY MEDICINE

## 2019-10-22 RX ORDER — FLUTICASONE PROPIONATE 110 UG/1
1 AEROSOL, METERED RESPIRATORY (INHALATION)
COMMUNITY

## 2019-10-22 RX ORDER — LORATADINE 10 MG/1
TABLET ORAL
COMMUNITY
Start: 2018-11-01

## 2019-10-22 ASSESSMENT — ENCOUNTER SYMPTOMS: SHORTNESS OF BREATH: 1

## 2019-10-22 NOTE — ED TRIAGE NOTES
"A&O x4, ABCs intact. Pt presents with chest pain for the past 2 days. Pt states that the pain \"may have gotten a little worse today. Pt unable to identify anything that makes the pain worse, but it gets a little better with tylenol. Pt took tylenol  at 0930 this morning. Pt denies SOB. Pt has tenderness when palpating described area of pain.  "

## 2019-10-22 NOTE — ED PROVIDER NOTES
History     Chief Complaint:  Chest Pain    MAXIMUS Hensley is a 34 year old male with a history of GERD, hypertension, Eosinophilic esophagitis, amongst others, who presents with chest pain. The patient reports that for the past 2 days he has been experiencing chest sternal chest pain that radiates to the left side and into his upper left arm and shoulder blade. He endorses mild shortness of breath and feels that his sternum always needs to crack. The patient has been struggling with arm pain for awhile and is status post left shoulder surgery in 2006. He has been following orthopedics for this whom and saw them for the chest symptoms, whom thought it was possibly GI related and sent him to his gastroenterologist which he saw today and they were concerned for costochondritis. The patient denies leg swelling, painful expirations/inspirations, or recent travel. Of note the patient lifts items frequently at work which worsens the pain.    Cardiac/PE/DVT Risk Factors:  History of hypertension - Positive   History of hyperlipidemia - Negative   History of diabetes - Negative   History of smoking - Positive (not current)  Personal history of PE/DVT - Negative   Family history of PE/DVT - Negative   Family history of heart complications - Positive   Recent travel - Negative   Recent surgery - Negative   Other immobilizations - Negative   Cancer - Negative     Allergies:  Bupropion  Ceclor Cd      Medications:    Claritin  Albuterol  Flonase  Flovent  Bystolic   Prilosec     Past Medical History:    Depression  Tendonitis   Plantar fascitis   Anxiety   Adhesive capsulitis of shoulder   fibromyalgia   obesity   Eosinophilic esophagitis  GERD  Hypertension   Learning disabilities   Tremor  costochondritis   Learning disability   Lumbago   Cervicalgia   Physiologic anisocoria     Past Surgical History:    EGD x2  [place] teeth extractiosn  Left shoulder tendon tear scope   Tonsillectomy and adenoidectomy     Family  History:    Mother: diabetes, kidney stones, GERD  Father: kidney cancer, diabetes  Maternal grandmother: diabetes, colorectal cancer  Maternal grandfather: parkinson's disease, diabetes, dementia     Social History:  The patient was accompanied to the ED by mother.  Smoking Status: Former Smoker  Smokeless Tobacco: Never Used  Alcohol Use: Positive  Drug Use: Negative  PCP: Ashlie Paul Alburtis   Marital Status:  Single     Review of Systems   Respiratory: Positive for shortness of breath.    Cardiovascular: Positive for chest pain. Negative for leg swelling.   Musculoskeletal:        Left arm/shoulder pain   All other systems reviewed and are negative.    Physical Exam     Patient Vitals for the past 24 hrs:   BP Temp Temp src Pulse Heart Rate Resp SpO2   10/22/19 1545 -- -- -- -- 71 -- --   10/22/19 1515 -- -- -- -- -- 19 94 %   10/22/19 1409 134/86 98.1  F (36.7  C) Oral 79 -- 16 96 %      Physical Exam  Constitutional: Alert, attentive, GCS 15  HENT:    Nose: Nose normal.    Mouth/Throat: Oropharynx is clear, mucous membranes are moist  Eyes: EOM are normal, anicteric, conjugate gaze  CV: regular rate and rhythm; no murmurs  Chest: Effort normal and breath sounds clear without wheezing or rales, symmetric bilaterally. Focal L lateral sternal tenderness  GI:  non tender. No distension. No guarding or rebound.    MSK: No LE edema, no tenderness to palpation of BLE.  Neurological: Alert, attentive, moving all extremities equally.   Skin: Skin is warm and dry.    Emergency Department Course     ECG:  ECG taken at 1406  Normal sinus rhythm  Low voltage QRS  Borderline ECG  Rate 78 bpm. MN interval 180 ms. QRS duration 90 ms. QT/QTc 346/394 ms. P-R-T axes 54 47 57.    Imaging:  Radiology findings were communicated with the patient who voiced understanding of the findings.    XR Chest 2 Views  No acute cardiopulmonary disease.  DISHA MERCER MD  Reading per radiology      Laboratory:  Laboratory  findings were communicated with the patient who voiced understanding of the findings.    CBC: WBC 11.0, HGB 14.5,   BMP: WNL (Creatinine 0.72)    Troponin (Collected 1414): <0.015      Emergency Department Course:    1406 EKG obtained as noted above.     1410 Nursing notes and vitals reviewed. I performed an exam of the patient as documented above.     1414 IV was inserted and blood was drawn for laboratory testing, results above.     1515 The patient was sent for a XR while in the emergency department, results above.      1550 Prior to discharge, I personally reviewed the results with the patient and all related questions were answered. The patient verbalized understanding and is amenable to plan.     Impression & Plan      Medical Decision Making:  Yuan Hensley is a 34 year old male with past medical history of fibromyalgia who presents to the emergency department today for evaluation of constant left sided chest pain exacerbated with ranging his left arm described as sharp pain right at the costochondral junction. His exam is consistent with fully reproducible chest wall pain suggestive of costochondritis. EKG and troponin negative with a low suspicion of cardiac etiology given no clear exacerbating changes with activity. He is PERC negative. Chest xray is clear. I have recommended conservative management with Tylenol, ibuprofen, heat pack, and follow up with PCP for recheck. Return precautions were reviewed.     Diagnosis:    ICD-10-CM    1. Chest wall pain R07.89      Disposition:   The patient is discharged to home.     Discharge Medications:  No discharge medications.     Wes Campos MD  [employer]  6:25 PM 10/22/19     Scribe Disclosure:  I, Orla Severson, am serving as a scribe at 2:22 PM on 10/22/2019 to document services personally performed by Wes Campos MD based on my observations and the provider's statements to me.   [facility]  [facility] EMERGENCY DEPARTMENT       WestonWes MD  10/22/19 2558

## 2019-10-22 NOTE — ED AVS SNAPSHOT
[facility] Emergency Department  [address]  [facility] [address]  Phone:  [phone]  Fax:  [phone]                                    Yuan Hensley   MRN: [id number]    Department:  [facility] Emergency Department   Date of Visit:  10/22/2019           After Visit Summary Signature Page    I have received my discharge instructions, and my questions have been answered. I have discussed any challenges I see with this plan with the nurse or doctor.    ..........................................................................................................................................  Patient/Patient Representative Signature      ..........................................................................................................................................  Patient Representative Print Name and Relationship to Patient    ..................................................               ................................................  Date                                   Time    ..........................................................................................................................................  Reviewed by Signature/Title    ...................................................              ..............................................  Date                                               Time          22EPIC Rev 08/18

## 2019-12-15 ENCOUNTER — HEALTH MAINTENANCE LETTER (OUTPATIENT)
Age: 34
End: 2019-12-15

## 2020-03-31 ENCOUNTER — HOSPITAL ENCOUNTER (EMERGENCY)
Facility: CLINIC | Age: 35
Discharge: HOME | End: 2020-03-31
Attending: EMERGENCY MEDICINE | Admitting: EMERGENCY MEDICINE
Payer: MEDICARE

## 2020-03-31 ENCOUNTER — APPOINTMENT (OUTPATIENT)
Dept: GENERAL RADIOLOGY | Facility: CLINIC | Age: 35
End: 2020-03-31
Attending: EMERGENCY MEDICINE
Payer: MEDICARE

## 2020-03-31 VITALS
SYSTOLIC BLOOD PRESSURE: 150 MMHG | RESPIRATION RATE: 20 BRPM | OXYGEN SATURATION: 94 % | BODY MASS INDEX: 35.36 KG/M2 | WEIGHT: 253.53 LBS | TEMPERATURE: 98.2 F | DIASTOLIC BLOOD PRESSURE: 88 MMHG

## 2020-03-31 DIAGNOSIS — M79.10 MYALGIA: ICD-10-CM

## 2020-03-31 DIAGNOSIS — J18.9 PNEUMONIA OF RIGHT LUNG DUE TO INFECTIOUS ORGANISM, UNSPECIFIED PART OF LUNG: ICD-10-CM

## 2020-03-31 DIAGNOSIS — R05.9 COUGH: ICD-10-CM

## 2020-03-31 LAB
DEPRECATED S PYO AG THROAT QL EIA: NEGATIVE
SPECIMEN SOURCE: NORMAL
SPECIMEN SOURCE: NORMAL
STREP GROUP A PCR: NOT DETECTED

## 2020-03-31 PROCEDURE — 40001204 ZZHCL STATISTIC STREP A RAPID: Performed by: EMERGENCY MEDICINE

## 2020-03-31 PROCEDURE — 99284 EMERGENCY DEPT VISIT MOD MDM: CPT | Mod: 25

## 2020-03-31 PROCEDURE — 87651 STREP A DNA AMP PROBE: CPT | Performed by: EMERGENCY MEDICINE

## 2020-03-31 PROCEDURE — 71045 X-RAY EXAM CHEST 1 VIEW: CPT

## 2020-03-31 RX ORDER — AZITHROMYCIN 200 MG/5ML
POWDER, FOR SUSPENSION ORAL
Qty: 1 BOTTLE | Refills: 0 | Status: SHIPPED | OUTPATIENT
Start: 2020-03-31 | End: 2020-04-04

## 2020-03-31 NOTE — DISCHARGE INSTRUCTIONS
Discharge Instructions  COVID-19    Your Provider has determined that you should practice self-isolation and self-monitoring in order to protect yourself and your community from COVID-19, which is the disease caused by a new coronavirus. The virus spreads from person to person primarily by droplets when an infected person coughs or sneezes and the droplet either lands on another person or that other person touches a surface with the droplet on it. Diagnosis of COVID-19 can be made with a test but many times the test is unavailable or not necessary. There is no specific treatment or medicine for the disease.    Symptoms of COVID-19  Many people have no symptoms or mild symptoms.  Symptoms may usually appear 4 to 5 days (up to 14 days) after contact with another ill person. Some people will get severe symptoms and pneumonia. Usual symptoms are:     Fever    Cough    Trouble breathing    Less common symptoms are: Headache, body aches, sore throat, sneezing,               diarrhea.    How to Care for Yourself    Stay home  Most people will recover from illness with mild symptoms.  Isolation by staying home is the best method to prevent the spread of the illness. Do not go to work or school. Have a friend or relative do your shopping. Do not use public transportation (bus, train) or ridesharing (Lyft, Uber).    How long should I stay home?    If you have symptoms of a respiratory disease (fever, cough), you should stay home for at least 7 days, and for 3 days with no fever and improvement of respiratory symptoms--whichever is longer. (Your fever should be gone for 3 days without using fever-reducing medicine.)  For example, if you have a fever and coughing for 4 days, you need to stay home 3 more days with no fever for a total of 7 days. Or, if you have a fever and coughing for 5 days, you need to stay home 3 more days with no fever for a total of 8 days.    Separate yourself from other people in your home.? As much as  possible, you should stay in one room and away from other people in your home. Also, use a separate bathroom, if possible. Avoid handling pets or other animals while sick.     Wear a facemask: if you need to be around other people and cover your mouth and nose with a tissue when you cough or sneeze.     Avoid sharing personal household items. You should not share dishes, drinking glasses, forks/knives/spoons, towels, or bedding with other people in your home. After using these items, they should be washed with soap and water. Clean parts of your home that are touched often (doorknobs, faucets, countertops, etc.) daily.     Wash your hands often with soap and water for at least 20 seconds or use an alcohol-based hand  containing at least 60% alcohol.     Avoid touching your face    Treat your symptoms: Take over the counter medications like ibuprofen (Advil or Motrin) or Acetaminophen (Tylenol). Drink fluids. Rest.    Watch for worsening symptoms: shortness of breath, or difficulty breathing.    Return to the Emergency Department if:    If you are developing worsening breathing, shortness of breath, or feel worse you should seek medical attention.  If you are uncertain, contact your health care provider/clinic. If you need emergency medical attention, call 911 and tell them you have been ill.      Discharge Instructions  Bronchitis, Pneumonia, Bronchospasm    You were seen today for a chest infection or inflammation. If your provider decided this was due to a bacterial infection, you may need an antibiotic. Sometimes these are caused by a virus, and then an antibiotic will not help.     Generally, every Emergency Department visit should have a follow-up clinic visit with either a primary or a specialty clinic/provider. Please follow-up as instructed by your emergency provider today.    Return to the Emergency Department if:  Your breathing gets much worse.  You are very weak, or feel much more ill.  You  develop new symptoms, such as chest pain.  You cough up blood.  You are vomiting (throwing up) enough that you cannot keep fluids or your medicine down.    What can I do to help myself?  Fill any prescriptions the provider gave you and take them right away--especially antibiotics. Be sure to finish the whole antibiotic prescription.  You may be given a prescription for an inhaler, which can help loosen tight air passages.  Use this as needed, but not more often than directed. Inhalers work much better when used with a spacer.   You may be given a prescription for a steroid to reduce inflammation. Used long-term, these can have side effects, but for short-term use they are safe. You may notice restlessness or increased appetite.      You may use non-prescription cough or cold medicines. Cough medicines may help, but don t make the cough go away completely.   Avoid smoke, because this can make your symptoms worse. If you smoke, this may be a good time to quit! Consider using nicotine lozenges, gum, or patches to reduce cravings.   If you have a fever, Tylenol  (acetaminophen), Motrin  (ibuprofen), or Advil  (ibuprofen) may help bring fever down and may help you feel more comfortable. Be sure to read and follow the package directions, and ask your provider if you have questions.  Be sure to get your flu shot each year.  For certain ages, the pneumonia shot can help prevent pneumonia.  If you were given a prescription for medicine here today, be sure to read all of the information (including the package insert) that comes with your prescription.  This will include important information about the medicine, its side effects, and any warnings that you need to know about.  The pharmacist who fills the prescription can provide more information and answer questions you may have about the medicine.  If you have questions or concerns that the pharmacist cannot address, please call or return to the Emergency Department.      Remember that you can always come back to the Emergency Department if you are not able to see your regular provider in the amount of time listed above, if you get any new symptoms, or if there is anything that worries you.

## 2020-03-31 NOTE — RESULT ENCOUNTER NOTE
Group A Streptococcus PCR is NEGATIVE  No treatment or change in treatment [facility] ED lab result protocol - Strep protocol.

## 2020-03-31 NOTE — ED AVS SNAPSHOT
[facility] Emergency Department  [address]  [facility] [address]  Phone:  [phone]  Fax:  [phone]                                    Yuan Hensley   MRN: [id number]    Department:  [facility] Emergency Department   Date of Visit:  3/31/2020           After Visit Summary Signature Page    I have received my discharge instructions, and my questions have been answered. I have discussed any challenges I see with this plan with the nurse or doctor.    ..........................................................................................................................................  Patient/Patient Representative Signature      ..........................................................................................................................................  Patient Representative Print Name and Relationship to Patient    ..................................................               ................................................  Date                                   Time    ..........................................................................................................................................  Reviewed by Signature/Title    ...................................................              ..............................................  Date                                               Time          22EPIC Rev 08/18

## 2020-03-31 NOTE — ED PROVIDER NOTES
History     Chief Complaint:  Cough; Pharyngitis; Shortness of Breath; and Generalized Body Aches      HPI   Yuan Hensley is a 34 year old male who presents with 1-2 days of myalgias, scratchy throat, shortness of breath and mild cough.  No CP.  No pain with breathing.  No travel.  Works in grocery store.  No vomiting or diarrhea.  Tolerating PO.  No leg pain or swelling.  No hx of PE or DVT.    Allergies:  Bupropion  Ceclor Cd       Medications:    Claritin  Albuterol  Flonase  Flovent  Bystolic   Prilosec      Past Medical History:    Depression  Tendonitis   Plantar fascitis   Anxiety   Adhesive capsulitis of shoulder   Fibromyalgia    Obesity   Eosinophilic esophagitis  GERD  Hypertension   Learning disabilities   Tremor  costochondritis   Lumbago   Cervicalgia   Physiologic anisocoria      Past Surgical History:    EGD x2  [place] teeth extractiosn  Left shoulder tendon tear scope   Tonsillectomy and adenoidectomy      Family History:    Mother: diabetes, kidney stones, GERD  Father: kidney cancer, diabetes  Maternal grandmother: diabetes, colorectal cancer  Maternal grandfather: parkinson's disease, diabetes, dementia      Social History:  The patient was accompanied to the ED by mother.  Smoking Status: Former Smoker  Smokeless Tobacco: Never Used  Alcohol Use: Positive  Drug Use: Negative  PCP: Clinic, [facility]   Marital Status:  Single       Review of Systems  Pos cough  Pos SOB  Neg CP  Neg abd pain or vomiting  Neg leg swelling  A full 10 point ROS was completed.  Pertinent positives are noted in the HPI.  All other systems reviewed and negative.    Physical Exam     Patient Vitals for the past 24 hrs:   BP Temp Temp src Heart Rate Resp SpO2 Weight   03/31/20 1045 -- -- -- -- -- 94 % --   03/31/20 1030 -- -- -- -- -- 96 % --   03/31/20 1015 (!) 150/88 -- -- -- -- 97 % --   03/31/20 0945 (!) 168/104 98.2  F (36.8  C) Temporal 95 20 98 % 115 kg (253 lb 8.5 oz)       Physical  Exam  Gen: alert  HEENT: PERRL, posterior oropharynx red  Neck: normal ROM  CV: RRR, no murmurs  Pulm: breath sounds equal, lungs clear  Abd: Soft, nontender  Back: no evidence of injury, no cva tenderness  MSK: no deformity, moves all extremities  Skin: no rash  Neuro: alert, appropriate conversation and interaction    Emergency Department Course     Imaging:  Radiology findings were communicated with the patient who voiced understanding of the findings.    XR Chest Port 1 view:  Portable chest. Subtle right perihilar airspace opacities   are noted and represent a change from the prior exam. Left lung is   clear. Heart is normal in size. No pneumothorax.     Readings as per radiology.     Laboratory:  Laboratory findings were communicated with the patient who voiced understanding of the findings.    Rapid Strep Test: Negative   Strep Culture: Pending     Emergency Department Course:  Past medical records, nursing notes, and vitals reviewed.     The patient was sent for xray imaging while in the emergency department, results above.    The patient's throat was swabbed and this sample was sent for rapid strep screen, findings above.     0956: I performed an exam of the patient as documented above.   1121: Patient rechecked and updated.      Findings and plan explained to the Patient. Patient discharged home with instructions regarding supportive care, medications, and reasons to return. The importance of close follow-up was reviewed. The patient was prescribed azithromycin.     I personally reviewed the laboratory and imaging results with the patient and answered all related questions prior to discharge.     Impression & Plan     Medical Decision Making:  The patient presents for cough, myalgias, and shortness of breath. Rapid strep negative. His chest xray does show several pulmonary infiltrates on the right. Discussed with patient the differential diagnosis does include bacterial pneumonia, viral pneumonia, and  COVID-19. At this time, patient with normal O2 SATs and no significant increased work of breathing at rest. Will prescribe antibiotic as below. Provided strict instructions to patient regarding home quarantine and return precautions for increasing shortness of breath, chest pain, or other concerns.  Recommended close PCP follow up. Discharged home.     Discharge Diagnosis:    ICD-10-CM    1. Pneumonia of right lung due to infectious organism, unspecified part of lung  J18.9    2. Cough  R05    3. Myalgia  M79.10      Disposition:  Discharged home.      Discharge Medications:  New Prescriptions    AZITHROMYCIN (ZITHROMAX) 200 MG/5ML SUSPENSION    Take 500 mg once on day one and then 250 mg daily for 4 days.     Scribe Disclosure:  IEve, am serving as a scribe at 9:53 AM on 3/31/2020 to document services personally performed by Paula Saldana MD based on my observations and the provider's statements to me.     3/31/2020   [facility] EMERGENCY DEPARTMENT       Paula Saldana MD  03/31/20 5341

## 2020-03-31 NOTE — ED TRIAGE NOTES
Pt has multiple presenting sxs since yesterday and they are persisting today.  He works in a grocery store and frequent public contact.

## 2021-01-15 ENCOUNTER — HEALTH MAINTENANCE LETTER (OUTPATIENT)
Age: 36
End: 2021-01-15

## 2021-09-04 ENCOUNTER — HEALTH MAINTENANCE LETTER (OUTPATIENT)
Age: 36
End: 2021-09-04

## 2022-02-19 ENCOUNTER — HEALTH MAINTENANCE LETTER (OUTPATIENT)
Age: 37
End: 2022-02-19

## 2022-10-16 ENCOUNTER — HEALTH MAINTENANCE LETTER (OUTPATIENT)
Age: 37
End: 2022-10-16

## 2023-04-01 ENCOUNTER — HEALTH MAINTENANCE LETTER (OUTPATIENT)
Age: 38
End: 2023-04-01

## 2023-10-07 ENCOUNTER — HOSPITAL ENCOUNTER (EMERGENCY)
Facility: CLINIC | Age: 38
Discharge: HOME | End: 2023-10-08
Attending: EMERGENCY MEDICINE | Admitting: EMERGENCY MEDICINE
Payer: COMMERCIAL

## 2023-10-07 VITALS
HEART RATE: 64 BPM | SYSTOLIC BLOOD PRESSURE: 158 MMHG | RESPIRATION RATE: 18 BRPM | TEMPERATURE: 97.3 F | OXYGEN SATURATION: 99 % | DIASTOLIC BLOOD PRESSURE: 99 MMHG

## 2023-10-07 DIAGNOSIS — R05.2 SUBACUTE COUGH: ICD-10-CM

## 2023-10-07 LAB
FLUAV RNA SPEC QL NAA+PROBE: NEGATIVE
FLUBV RNA RESP QL NAA+PROBE: NEGATIVE
RSV RNA SPEC NAA+PROBE: NEGATIVE
SARS-COV-2 RNA RESP QL NAA+PROBE: NEGATIVE

## 2023-10-07 PROCEDURE — 99284 EMERGENCY DEPT VISIT MOD MDM: CPT | Mod: 25

## 2023-10-07 PROCEDURE — 87637 SARSCOV2&INF A&B&RSV AMP PRB: CPT | Performed by: EMERGENCY MEDICINE

## 2023-10-08 ENCOUNTER — APPOINTMENT (OUTPATIENT)
Dept: GENERAL RADIOLOGY | Facility: CLINIC | Age: 38
End: 2023-10-08
Attending: EMERGENCY MEDICINE
Payer: COMMERCIAL

## 2023-10-08 PROCEDURE — 71046 X-RAY EXAM CHEST 2 VIEWS: CPT

## 2023-10-08 RX ORDER — ALBUTEROL SULFATE 90 UG/1
2 AEROSOL, METERED RESPIRATORY (INHALATION) EVERY 6 HOURS PRN
Qty: 18 G | Refills: 0 | Status: SHIPPED | OUTPATIENT
Start: 2023-10-08

## 2023-10-08 ASSESSMENT — ACTIVITIES OF DAILY LIVING (ADL): ADLS_ACUITY_SCORE: 35

## 2023-10-08 NOTE — ED PROVIDER NOTES
History     Chief Complaint:  Cough       HPI   Yuan MYRA Hensley is a 38 year old male who presents with ongoing nonproductive cough for the past 3 weeks.  Initially he had itchy eyes, runny nose, sneezing as well as cough.  He may have had a fever at that time as well.  He denies any chest pain or shortness of breath.  He denies any leg swelling.  He denies any past history of asthma.  He is not a smoker.  His son is also sick with similar symptoms.  He has been trying over-the-counter medications including Mucinex among others.      Independent Historian:    None-patient only    Review of External Notes:  None    Medications:    nebivolol (BYSTOLIC) 5 MG tablet  omeprazole (PRILOSEC) 40 MG capsule  Mucinex        Past Medical History:    Past Medical History:   Diagnosis Date    Depressive disorder     Fibromyalgia     Gastro-oesophageal reflux disease     Hypertension        Past Surgical History:    Past Surgical History:   Procedure Laterality Date    ESOPHAGOSCOPY, GASTROSCOPY, DUODENOSCOPY (EGD), COMBINED  9/15/2015    Dr. Goldstein [facility]    ESOPHAGOSCOPY, GASTROSCOPY, DUODENOSCOPY (EGD), COMBINED N/A 9/15/2015    Procedure: COMBINED ESOPHAGOSCOPY, GASTROSCOPY, DUODENOSCOPY (EGD), BIOPSY SINGLE OR MULTIPLE;  Surgeon: Brandt Goldstein MD;  Location:  GI    HEAD & NECK SURGERY      1 wisdom tooth removed    ORTHOPEDIC SURGERY  2007    left shoulder tendon tear scope done    TONSILLECTOMY      T&A age 12 years          Physical Exam   Patient Vitals for the past 24 hrs:   BP Temp Temp src Pulse Resp SpO2   10/07/23 2255 (!) 158/99 97.3  F (36.3  C) Temporal 64 18 99 %        Physical Exam  Vitals and nursing note reviewed.   Constitutional:       General: He is not in acute distress.     Appearance: He is not ill-appearing.   HENT:      Head: Normocephalic and atraumatic.      Right Ear: External ear normal.      Left Ear: External ear normal.      Nose: Nose normal.   Eyes:      Conjunctiva/sclera:  Conjunctivae normal.   Cardiovascular:      Rate and Rhythm: Normal rate and regular rhythm.      Heart sounds: No murmur heard.  Pulmonary:      Effort: Pulmonary effort is normal. No respiratory distress.      Breath sounds: No wheezing, rhonchi or rales.   Abdominal:      General: Abdomen is flat. There is no distension.      Palpations: Abdomen is soft.      Tenderness: There is no abdominal tenderness. There is no guarding or rebound.   Musculoskeletal:         General: No swelling or deformity.      Cervical back: Normal range of motion and neck supple.   Skin:     General: Skin is warm and dry.      Findings: No rash.   Neurological:      Mental Status: He is alert and oriented to person, place, and time.   Psychiatric:         Behavior: Behavior normal.           Emergency Department Course     Imaging:  XR Chest 2 Views   Final Result   IMPRESSION: Negative chest.        Report per radiology    Laboratory:  Labs Ordered and Resulted from Time of ED Arrival to Time of ED Departure   INFLUENZA A/B, RSV, & SARS-COV2 PCR - Normal       Result Value    Influenza A PCR Negative      Influenza B PCR Negative      RSV PCR Negative      SARS CoV2 PCR Negative            Emergency Department Course & Assessments:             Interventions:  Medications - No data to display     Independent Interpretation (X-rays, CTs, rhythm strip):  I reviewed the chest x-ray and there is no obvious infiltrate or pneumothorax per my read    Consultations/Discussion of Management or Tests:  None       Social Determinants of Health affecting care:  None     Disposition:  The patient was discharged to home.     Impression & Plan    CMS Diagnoses: None    Medical Decision Makin-year-old male presenting with 3 weeks of cough.  I suspect this is likely a postviral cough.  There is no wheezing to suggest that he has underlying asthma or reactive airway disease.  Chest x-ray shows no signs of any pneumonia, lung mass, pleural effusion,  etc.  He has no signs of any pulmonary edema and no peripheral edema.  No chest pain or shortness of breath to suggest PE.  We will give a prescription for albuterol MDI and recommend continued over-the-counter measures.  Follow-up with primary care physician.      Diagnosis:    ICD-10-CM    1. Subacute cough  R05.2            Discharge Medications:  Discharge Medication List as of 10/8/2023  1:11 AM             10/8/2023   Luiz Perez MD Goodwin, Shaun M, MD  10/08/23 0226

## 2024-06-01 ENCOUNTER — HEALTH MAINTENANCE LETTER (OUTPATIENT)
Age: 39
End: 2024-06-01

## 2025-06-14 ENCOUNTER — HEALTH MAINTENANCE LETTER (OUTPATIENT)
Age: 40
End: 2025-06-14